# Patient Record
Sex: FEMALE | Race: WHITE | URBAN - METROPOLITAN AREA
[De-identification: names, ages, dates, MRNs, and addresses within clinical notes are randomized per-mention and may not be internally consistent; named-entity substitution may affect disease eponyms.]

---

## 2018-03-29 ENCOUNTER — ED HISTORICAL/CONVERTED ENCOUNTER (OUTPATIENT)
Dept: OTHER | Age: 58
End: 2018-03-29

## 2018-04-17 ENCOUNTER — OP HISTORICAL/CONVERTED ENCOUNTER (OUTPATIENT)
Dept: OTHER | Age: 58
End: 2018-04-17

## 2018-05-29 ENCOUNTER — OP HISTORICAL/CONVERTED ENCOUNTER (OUTPATIENT)
Dept: OTHER | Age: 58
End: 2018-05-29

## 2024-03-11 ENCOUNTER — TELEPHONE (OUTPATIENT)
Age: 64
End: 2024-03-11

## 2024-03-11 NOTE — TELEPHONE ENCOUNTER
I called [facility] [phone] requested offive visit notes and labs

## 2024-03-17 PROBLEM — N30.91 CYSTITIS WITH HEMATURIA: Status: ACTIVE | Noted: 2024-03-17

## 2024-03-18 ENCOUNTER — OFFICE VISIT (OUTPATIENT)
Age: 64
End: 2024-03-18
Payer: MEDICAID

## 2024-03-18 VITALS — DIASTOLIC BLOOD PRESSURE: 85 MMHG | HEART RATE: 102 BPM | SYSTOLIC BLOOD PRESSURE: 133 MMHG

## 2024-03-18 DIAGNOSIS — R31.0 GROSS HEMATURIA: ICD-10-CM

## 2024-03-18 DIAGNOSIS — N34.2 ATROPHIC URETHRITIS: ICD-10-CM

## 2024-03-18 DIAGNOSIS — N30.91 CYSTITIS WITH HEMATURIA: Primary | ICD-10-CM

## 2024-03-18 DIAGNOSIS — Z85.51 HISTORY OF BLADDER CANCER: ICD-10-CM

## 2024-03-18 DIAGNOSIS — Z85.41 HISTORY OF CERVICAL CANCER: ICD-10-CM

## 2024-03-18 DIAGNOSIS — Z87.440 HISTORY OF RECURRENT UTIS: ICD-10-CM

## 2024-03-18 DIAGNOSIS — R82.81 PYURIA: ICD-10-CM

## 2024-03-18 DIAGNOSIS — R35.0 FREQUENCY OF MICTURITION: ICD-10-CM

## 2024-03-18 DIAGNOSIS — N30.40 RADIATION CYSTITIS: ICD-10-CM

## 2024-03-18 LAB
BILIRUBIN, URINE, POC: NEGATIVE
BLOOD URINE, POC: NORMAL
GLUCOSE URINE, POC: NEGATIVE
KETONES, URINE, POC: NEGATIVE
LEUKOCYTE ESTERASE, URINE, POC: NORMAL
NITRITE, URINE, POC: NEGATIVE
PH, URINE, POC: 5.5 (ref 4.6–8)
PROTEIN,URINE, POC: NORMAL
PVR, POC: NORMAL CC
SPECIFIC GRAVITY, URINE, POC: 1.02 (ref 1–1.03)
URINALYSIS CLARITY, POC: CLEAR
URINALYSIS COLOR, POC: YELLOW
UROBILINOGEN, POC: NORMAL

## 2024-03-18 PROCEDURE — 51798 US URINE CAPACITY MEASURE: CPT | Performed by: UROLOGY

## 2024-03-18 PROCEDURE — 99204 OFFICE O/P NEW MOD 45 MIN: CPT | Performed by: UROLOGY

## 2024-03-18 PROCEDURE — 81003 URINALYSIS AUTO W/O SCOPE: CPT | Performed by: UROLOGY

## 2024-03-18 RX ORDER — LEVOTHYROXINE SODIUM 0.1 MG/1
100 TABLET ORAL DAILY
COMMUNITY
Start: 2024-03-09

## 2024-03-18 RX ORDER — ESTRADIOL 0.1 MG/G
1 CREAM VAGINAL
Qty: 42.5 G | Refills: 5 | Status: SHIPPED | OUTPATIENT
Start: 2024-03-18

## 2024-03-18 RX ORDER — NITROFURANTOIN 25; 75 MG/1; MG/1
CAPSULE ORAL
COMMUNITY
Start: 2024-02-27

## 2024-03-18 NOTE — PROGRESS NOTES
Chief Complaint   Patient presents with    Follow-up     PF for cystitis        /85   Pulse (!) 102      PHQ-9 score is    Negative      1. \"Have you been to the ER, urgent care clinic since your last visit?  Hospitalized since your last visit?\" No    2. \"Have you seen or consulted any other health care providers outside of the [facility] since your last visit?\" No     3. For patients aged 45-75: Has the patient had a colonoscopy / FIT/ Cologuard? Yes - no Care Gap present      If the patient is female:    4. For patients aged 40-74: Has the patient had a mammogram within the past 2 years? Yes - no Care Gap present      5. For patients aged 21-65: Has the patient had a pap smear? Yes - no Care Gap present  
Future  6. Radiation cystitis  7. History of recurrent UTIs  -     estradiol (ESTRACE VAGINAL) 0.1 MG/GM vaginal cream; Place 1 g vaginally three times a week, Disp-42.5 g, R-5Normal  8. Atrophic urethritis  -     estradiol (ESTRACE VAGINAL) 0.1 MG/GM vaginal cream; Place 1 g vaginally three times a week, Disp-42.5 g, R-5Normal  9. History of cervical cancer  -     CT ABDOMEN PELVIS W WO CONTRAST Additional Contrast? Radiologist Recommendation; Future      Patient needs CT scan as well as Estrace as well as cystoscopy with urine for cytology  Jerome Abarca MD      Please note that portions of this note was potentially completed with Dragon dictation, the computer voice recognition software.  Quite often unanticipated grammatical, syntax, homophones, and other interpretive errors are inadvertently transcribed by the computer software.  Please disregard these errors.  Please excuse any errors that have escaped final proofreading.  Thank you.

## 2024-03-19 ASSESSMENT — ENCOUNTER SYMPTOMS: DIARRHEA: 1

## 2024-03-20 LAB — BACTERIA UR CULT: NO GROWTH

## 2024-03-22 ENCOUNTER — TELEPHONE (OUTPATIENT)
Age: 64
End: 2024-03-22

## 2024-03-22 NOTE — TELEPHONE ENCOUNTER
Pt called saying her pharmacy sent us back a request to send something beside the estradiol as her insurance doesn't cover it. Have you received anything about this? Does this medication require prior auth? Please advise.

## 2024-03-25 RX ORDER — CONJUGATED ESTROGENS 0.62 MG/G
CREAM VAGINAL
Qty: 30 G | Refills: 2 | Status: SHIPPED | OUTPATIENT
Start: 2024-03-25

## 2024-04-03 ENCOUNTER — HOSPITAL ENCOUNTER (OUTPATIENT)
Facility: HOSPITAL | Age: 64
Discharge: HOME | End: 2024-04-06
Payer: MEDICAID

## 2024-04-03 ENCOUNTER — PROCEDURE VISIT (OUTPATIENT)
Age: 64
End: 2024-04-03
Payer: MEDICAID

## 2024-04-03 ENCOUNTER — PREP FOR PROCEDURE (OUTPATIENT)
Age: 64
End: 2024-04-03

## 2024-04-03 VITALS
SYSTOLIC BLOOD PRESSURE: 123 MMHG | OXYGEN SATURATION: 99 % | TEMPERATURE: 98.7 F | BODY MASS INDEX: 29.1 KG/M2 | HEART RATE: 90 BPM | RESPIRATION RATE: 18 BRPM | WEIGHT: 192 LBS | DIASTOLIC BLOOD PRESSURE: 89 MMHG | HEIGHT: 68 IN

## 2024-04-03 VITALS — SYSTOLIC BLOOD PRESSURE: 151 MMHG | HEART RATE: 98 BPM | DIASTOLIC BLOOD PRESSURE: 90 MMHG

## 2024-04-03 DIAGNOSIS — Z87.440 HISTORY OF RECURRENT UTIS: ICD-10-CM

## 2024-04-03 DIAGNOSIS — N30.40 RADIATION CYSTITIS: ICD-10-CM

## 2024-04-03 DIAGNOSIS — Z85.51 HISTORY OF BLADDER CANCER: Primary | ICD-10-CM

## 2024-04-03 DIAGNOSIS — R31.0 GROSS HEMATURIA: ICD-10-CM

## 2024-04-03 LAB
ABO + RH BLD: NORMAL
ALBUMIN SERPL-MCNC: 4 G/DL (ref 3.5–5)
ALBUMIN/GLOB SERPL: 1.2 (ref 1.1–2.2)
ALP SERPL-CCNC: 79 U/L (ref 45–117)
ALT SERPL-CCNC: 27 U/L (ref 12–78)
ANION GAP SERPL CALC-SCNC: 6 MMOL/L (ref 5–15)
APPEARANCE UR: CLEAR
AST SERPL W P-5'-P-CCNC: 18 U/L (ref 15–37)
BACTERIA URNS QL MICRO: NEGATIVE /HPF
BACTERIA URNS QL MICRO: NEGATIVE /HPF
BASOPHILS # BLD: 0.1 K/UL (ref 0–0.1)
BASOPHILS NFR BLD: 1 % (ref 0–1)
BILIRUB SERPL-MCNC: 0.5 MG/DL (ref 0.2–1)
BILIRUB UR QL: NEGATIVE
BILIRUBIN, URINE, POC: NEGATIVE
BLOOD GROUP ANTIBODIES SERPL: NEGATIVE
BLOOD URINE, POC: NORMAL
BUN SERPL-MCNC: 21 MG/DL (ref 6–20)
BUN/CREAT SERPL: 22 (ref 12–20)
CA-I BLD-MCNC: 9.3 MG/DL (ref 8.5–10.1)
CHLORIDE SERPL-SCNC: 108 MMOL/L (ref 97–108)
CO2 SERPL-SCNC: 26 MMOL/L (ref 21–32)
COLOR UR: ABNORMAL
CREAT SERPL-MCNC: 0.97 MG/DL (ref 0.55–1.02)
DIFFERENTIAL METHOD BLD: NORMAL
EOSINOPHIL # BLD: 0.4 K/UL (ref 0–0.4)
EOSINOPHIL NFR BLD: 6 % (ref 0–7)
EPITH CASTS URNS QL MICRO: ABNORMAL /LPF
ERYTHROCYTE [DISTWIDTH] IN BLOOD BY AUTOMATED COUNT: 14 % (ref 11.5–14.5)
GLOBULIN SER CALC-MCNC: 3.3 G/DL (ref 2–4)
GLUCOSE SERPL-MCNC: 109 MG/DL (ref 65–100)
GLUCOSE UR STRIP.AUTO-MCNC: NEGATIVE MG/DL
GLUCOSE URINE, POC: NEGATIVE
HCT VFR BLD AUTO: 40.4 % (ref 35–47)
HGB BLD-MCNC: 13.1 G/DL (ref 11.5–16)
HGB UR QL STRIP: ABNORMAL
IMM GRANULOCYTES # BLD AUTO: 0 K/UL (ref 0–0.04)
IMM GRANULOCYTES NFR BLD AUTO: 0 % (ref 0–0.5)
KETONES UR QL STRIP.AUTO: NEGATIVE MG/DL
KETONES, URINE, POC: NEGATIVE
LEUKOCYTE ESTERASE UR QL STRIP.AUTO: ABNORMAL
LEUKOCYTE ESTERASE, URINE, POC: NORMAL
LYMPHOCYTES # BLD: 1.5 K/UL (ref 0.8–3.5)
LYMPHOCYTES NFR BLD: 20 % (ref 12–49)
MCH RBC QN AUTO: 30.2 PG (ref 26–34)
MCHC RBC AUTO-ENTMCNC: 32.4 G/DL (ref 30–36.5)
MCV RBC AUTO: 93.1 FL (ref 80–99)
MONOCYTES # BLD: 0.7 K/UL (ref 0–1)
MONOCYTES NFR BLD: 9 % (ref 5–13)
MUCOUS THREADS URNS QL MICRO: ABNORMAL /LPF
MUCOUS THREADS URNS QL MICRO: ABNORMAL /LPF
NEUTS SEG # BLD: 4.8 K/UL (ref 1.8–8)
NEUTS SEG NFR BLD: 64 % (ref 32–75)
NITRITE UR QL STRIP.AUTO: NEGATIVE
NITRITE, URINE, POC: NEGATIVE
NRBC # BLD: 0 K/UL (ref 0–0.01)
NRBC BLD-RTO: 0 PER 100 WBC
PH UR STRIP: 6 (ref 5–8)
PH, URINE, POC: 5.5 (ref 4.6–8)
PLATELET # BLD AUTO: 260 K/UL (ref 150–400)
PMV BLD AUTO: 11 FL (ref 8.9–12.9)
POTASSIUM SERPL-SCNC: 4 MMOL/L (ref 3.5–5.1)
PROT SERPL-MCNC: 7.3 G/DL (ref 6.4–8.2)
PROT UR STRIP-MCNC: NEGATIVE MG/DL
PROTEIN,URINE, POC: NEGATIVE
RBC # BLD AUTO: 4.34 M/UL (ref 3.8–5.2)
RBC #/AREA URNS HPF: ABNORMAL /HPF (ref 0–5)
RBC #/AREA URNS HPF: ABNORMAL /HPF (ref 0–5)
SODIUM SERPL-SCNC: 140 MMOL/L (ref 136–145)
SP GR UR REFRACTOMETRY: 1.01 (ref 1–1.03)
SPECIFIC GRAVITY, URINE, POC: 1.02 (ref 1–1.03)
SPECIMEN EXP DATE BLD: NORMAL
URINALYSIS CLARITY, POC: CLEAR
URINALYSIS COLOR, POC: YELLOW
UROBILINOGEN UR QL STRIP.AUTO: 0.1 EU/DL (ref 0.1–1)
UROBILINOGEN, POC: NORMAL
WBC # BLD AUTO: 7.5 K/UL (ref 3.6–11)
WBC URNS QL MICRO: ABNORMAL /HPF (ref 0–4)
WBC URNS QL MICRO: ABNORMAL /HPF (ref 0–4)

## 2024-04-03 PROCEDURE — 86850 RBC ANTIBODY SCREEN: CPT

## 2024-04-03 PROCEDURE — 86900 BLOOD TYPING SEROLOGIC ABO: CPT

## 2024-04-03 PROCEDURE — 80053 COMPREHEN METABOLIC PANEL: CPT

## 2024-04-03 PROCEDURE — 36415 COLL VENOUS BLD VENIPUNCTURE: CPT

## 2024-04-03 PROCEDURE — 86901 BLOOD TYPING SEROLOGIC RH(D): CPT

## 2024-04-03 PROCEDURE — 85025 COMPLETE CBC W/AUTO DIFF WBC: CPT

## 2024-04-03 PROCEDURE — 81001 URINALYSIS AUTO W/SCOPE: CPT

## 2024-04-03 PROCEDURE — 81003 URINALYSIS AUTO W/O SCOPE: CPT | Performed by: UROLOGY

## 2024-04-03 RX ORDER — CETIRIZINE HYDROCHLORIDE 10 MG/1
CAPSULE, LIQUID FILLED ORAL
COMMUNITY

## 2024-04-03 NOTE — PROGRESS NOTES
Chief Complaint   Patient presents with    Procedure     cysto    Hematuria        BP (!) 151/90   Pulse 98      PHQ-9 score is    Negative      1. \"Have you been to the ER, urgent care clinic since your last visit?  Hospitalized since your last visit?\" No    2. \"Have you seen or consulted any other health care providers outside of the [facility] since your last visit?\" No     3. For patients aged 45-75: Has the patient had a colonoscopy / FIT/ Cologuard? Yes - no Care Gap present      If the patient is female:    4. For patients aged 40-74: Has the patient had a mammogram within the past 2 years? Yes - no Care Gap present      5. For patients aged 21-65: Has the patient had a pap smear? Yes - no Care Gap present

## 2024-04-03 NOTE — PROGRESS NOTES
HISTORY OF PRESENT ILLNESS  Daisy Michaud is a 63 y.o. female     1. History of bladder cancer  Overview:  2018 bladder cancer    2. Radiation cystitis  Overview:  S/p cervical cancer with a hysterectomy and radiation and chemotherapy   3. Gross hematuria  Overview:  2018 bladder cancer  Urine cytology negative   Ct scan is not done  4. History of recurrent UTIs  Overview:  3/18/2024 urine culture negative  Orders:  -     AMB POC URINALYSIS DIP STICK AUTO W/O MICRO  -     Culture, Urine        PAST MEDICAL HISTORY  PMHx (including negatives):  has a past medical history of Cervical cancer (HCC).   PSurgHx:  has a past surgical history that includes Cystocopy (04/03/2024).  PSocHx:  reports that she has quit smoking. Her smoking use included cigarettes. She has never used smokeless tobacco. She reports that she does not currently use alcohol. She reports that she does not use drugs.   FamilyHX:   Family History   Problem Relation Age of Onset    Stomach Cancer Father        ROS  Review of Systems      PHYSICAL EXAM  Physical Exam    ASSESSMENT and PLAN  1. History of bladder cancer  2. Radiation cystitis  3. Gross hematuria  4. History of recurrent UTIs  -     AMB POC URINALYSIS DIP STICK AUTO W/O MICRO  -     Culture, Urine        Jerome Abarca MD      Please note that portions of this note was potentially completed with Dragon dictation, the computer voice recognition software.  Quite often unanticipated grammatical, syntax, homophones, and other interpretive errors are inadvertently transcribed by the computer software.  Please disregard these errors.  Please excuse any errors that have escaped final proofreading.  Thank you.

## 2024-04-05 ENCOUNTER — ANESTHESIA (OUTPATIENT)
Facility: HOSPITAL | Age: 64
End: 2024-04-05
Payer: MEDICAID

## 2024-04-05 ENCOUNTER — HOSPITAL ENCOUNTER (OUTPATIENT)
Facility: HOSPITAL | Age: 64
Discharge: HOME | End: 2024-04-05
Attending: UROLOGY | Admitting: UROLOGY
Payer: MEDICAID

## 2024-04-05 ENCOUNTER — ANESTHESIA EVENT (OUTPATIENT)
Facility: HOSPITAL | Age: 64
End: 2024-04-05
Payer: MEDICAID

## 2024-04-05 VITALS
HEART RATE: 52 BPM | SYSTOLIC BLOOD PRESSURE: 153 MMHG | TEMPERATURE: 97.9 F | OXYGEN SATURATION: 98 % | RESPIRATION RATE: 18 BRPM | DIASTOLIC BLOOD PRESSURE: 76 MMHG

## 2024-04-05 DIAGNOSIS — Z85.51 HISTORY OF BLADDER CANCER: ICD-10-CM

## 2024-04-05 LAB — BACTERIA UR CULT: NORMAL

## 2024-04-05 PROCEDURE — 6360000002 HC RX W HCPCS: Performed by: UROLOGY

## 2024-04-05 PROCEDURE — 6370000000 HC RX 637 (ALT 250 FOR IP): Performed by: UROLOGY

## 2024-04-05 PROCEDURE — 88307 TISSUE EXAM BY PATHOLOGIST: CPT

## 2024-04-05 PROCEDURE — 3700000001 HC ADD 15 MINUTES (ANESTHESIA): Performed by: UROLOGY

## 2024-04-05 PROCEDURE — 7100000000 HC PACU RECOVERY - FIRST 15 MIN: Performed by: UROLOGY

## 2024-04-05 PROCEDURE — 7100000011 HC PHASE II RECOVERY - ADDTL 15 MIN: Performed by: UROLOGY

## 2024-04-05 PROCEDURE — 2500000003 HC RX 250 WO HCPCS: Performed by: NURSE ANESTHETIST, CERTIFIED REGISTERED

## 2024-04-05 PROCEDURE — 2709999900 HC NON-CHARGEABLE SUPPLY: Performed by: UROLOGY

## 2024-04-05 PROCEDURE — 3700000000 HC ANESTHESIA ATTENDED CARE: Performed by: UROLOGY

## 2024-04-05 PROCEDURE — 6360000002 HC RX W HCPCS: Performed by: NURSE ANESTHETIST, CERTIFIED REGISTERED

## 2024-04-05 PROCEDURE — 7100000010 HC PHASE II RECOVERY - FIRST 15 MIN: Performed by: UROLOGY

## 2024-04-05 PROCEDURE — 2580000003 HC RX 258: Performed by: UROLOGY

## 2024-04-05 PROCEDURE — 3600000003 HC SURGERY LEVEL 3 BASE: Performed by: UROLOGY

## 2024-04-05 PROCEDURE — 7100000001 HC PACU RECOVERY - ADDTL 15 MIN: Performed by: UROLOGY

## 2024-04-05 PROCEDURE — 3600000013 HC SURGERY LEVEL 3 ADDTL 15MIN: Performed by: UROLOGY

## 2024-04-05 RX ORDER — FENTANYL CITRATE 50 UG/ML
INJECTION, SOLUTION INTRAMUSCULAR; INTRAVENOUS PRN
Status: DISCONTINUED | OUTPATIENT
Start: 2024-04-05 | End: 2024-04-05 | Stop reason: SDUPTHER

## 2024-04-05 RX ORDER — SODIUM CHLORIDE 0.9 % (FLUSH) 0.9 %
5-40 SYRINGE (ML) INJECTION EVERY 12 HOURS SCHEDULED
Status: DISCONTINUED | OUTPATIENT
Start: 2024-04-05 | End: 2024-04-05 | Stop reason: HOSPADM

## 2024-04-05 RX ORDER — ONDANSETRON 2 MG/ML
INJECTION INTRAMUSCULAR; INTRAVENOUS PRN
Status: DISCONTINUED | OUTPATIENT
Start: 2024-04-05 | End: 2024-04-05 | Stop reason: SDUPTHER

## 2024-04-05 RX ORDER — DIPHENHYDRAMINE HYDROCHLORIDE 50 MG/ML
12.5 INJECTION INTRAMUSCULAR; INTRAVENOUS
Status: DISCONTINUED | OUTPATIENT
Start: 2024-04-05 | End: 2024-04-05 | Stop reason: HOSPADM

## 2024-04-05 RX ORDER — SODIUM CHLORIDE, SODIUM LACTATE, POTASSIUM CHLORIDE, CALCIUM CHLORIDE 600; 310; 30; 20 MG/100ML; MG/100ML; MG/100ML; MG/100ML
INJECTION, SOLUTION INTRAVENOUS ONCE
Status: DISCONTINUED | OUTPATIENT
Start: 2024-04-05 | End: 2024-04-05 | Stop reason: HOSPADM

## 2024-04-05 RX ORDER — PHENYLEPHRINE HCL IN 0.9% NACL 0.4MG/10ML
SYRINGE (ML) INTRAVENOUS PRN
Status: DISCONTINUED | OUTPATIENT
Start: 2024-04-05 | End: 2024-04-05 | Stop reason: SDUPTHER

## 2024-04-05 RX ORDER — OXYBUTYNIN CHLORIDE 5 MG/1
10 TABLET, EXTENDED RELEASE ORAL NIGHTLY
Status: DISCONTINUED | OUTPATIENT
Start: 2024-04-05 | End: 2024-04-05 | Stop reason: HOSPADM

## 2024-04-05 RX ORDER — FENTANYL CITRATE 50 UG/ML
50 INJECTION, SOLUTION INTRAMUSCULAR; INTRAVENOUS EVERY 5 MIN PRN
Status: DISCONTINUED | OUTPATIENT
Start: 2024-04-05 | End: 2024-04-05 | Stop reason: HOSPADM

## 2024-04-05 RX ORDER — SULFAMETHOXAZOLE AND TRIMETHOPRIM 800; 160 MG/1; MG/1
1 TABLET ORAL 2 TIMES DAILY
Qty: 10 TABLET | Refills: 0 | Status: SHIPPED | OUTPATIENT
Start: 2024-04-05 | End: 2024-04-10

## 2024-04-05 RX ORDER — DEXAMETHASONE SODIUM PHOSPHATE 4 MG/ML
INJECTION, SOLUTION INTRA-ARTICULAR; INTRALESIONAL; INTRAMUSCULAR; INTRAVENOUS; SOFT TISSUE PRN
Status: DISCONTINUED | OUTPATIENT
Start: 2024-04-05 | End: 2024-04-05 | Stop reason: SDUPTHER

## 2024-04-05 RX ORDER — LABETALOL HYDROCHLORIDE 5 MG/ML
10 INJECTION, SOLUTION INTRAVENOUS
Status: DISCONTINUED | OUTPATIENT
Start: 2024-04-05 | End: 2024-04-05 | Stop reason: HOSPADM

## 2024-04-05 RX ORDER — SODIUM CHLORIDE 0.9 % (FLUSH) 0.9 %
5-40 SYRINGE (ML) INJECTION PRN
Status: DISCONTINUED | OUTPATIENT
Start: 2024-04-05 | End: 2024-04-05 | Stop reason: HOSPADM

## 2024-04-05 RX ORDER — OXYCODONE HYDROCHLORIDE 5 MG/1
5 TABLET ORAL PRN
Status: DISCONTINUED | OUTPATIENT
Start: 2024-04-05 | End: 2024-04-05 | Stop reason: HOSPADM

## 2024-04-05 RX ORDER — LIDOCAINE HYDROCHLORIDE 20 MG/ML
JELLY TOPICAL PRN
Status: DISCONTINUED | OUTPATIENT
Start: 2024-04-05 | End: 2024-04-05 | Stop reason: ALTCHOICE

## 2024-04-05 RX ORDER — METOCLOPRAMIDE HYDROCHLORIDE 5 MG/ML
10 INJECTION INTRAMUSCULAR; INTRAVENOUS
Status: DISCONTINUED | OUTPATIENT
Start: 2024-04-05 | End: 2024-04-05 | Stop reason: HOSPADM

## 2024-04-05 RX ORDER — ONDANSETRON 2 MG/ML
4 INJECTION INTRAMUSCULAR; INTRAVENOUS
Status: DISCONTINUED | OUTPATIENT
Start: 2024-04-05 | End: 2024-04-05 | Stop reason: HOSPADM

## 2024-04-05 RX ORDER — MEPERIDINE HYDROCHLORIDE 25 MG/ML
12.5 INJECTION INTRAMUSCULAR; INTRAVENOUS; SUBCUTANEOUS EVERY 5 MIN PRN
Status: DISCONTINUED | OUTPATIENT
Start: 2024-04-05 | End: 2024-04-05 | Stop reason: HOSPADM

## 2024-04-05 RX ORDER — GLUCAGON 1 MG/ML
1 KIT INJECTION PRN
Status: DISCONTINUED | OUTPATIENT
Start: 2024-04-05 | End: 2024-04-05 | Stop reason: HOSPADM

## 2024-04-05 RX ORDER — SODIUM CHLORIDE, SODIUM LACTATE, POTASSIUM CHLORIDE, CALCIUM CHLORIDE 600; 310; 30; 20 MG/100ML; MG/100ML; MG/100ML; MG/100ML
INJECTION, SOLUTION INTRAVENOUS CONTINUOUS
Status: DISCONTINUED | OUTPATIENT
Start: 2024-04-05 | End: 2024-04-05 | Stop reason: HOSPADM

## 2024-04-05 RX ORDER — LIDOCAINE HYDROCHLORIDE 20 MG/ML
INJECTION, SOLUTION EPIDURAL; INFILTRATION; INTRACAUDAL; PERINEURAL PRN
Status: DISCONTINUED | OUTPATIENT
Start: 2024-04-05 | End: 2024-04-05 | Stop reason: SDUPTHER

## 2024-04-05 RX ORDER — SODIUM CHLORIDE 9 MG/ML
INJECTION, SOLUTION INTRAVENOUS PRN
Status: DISCONTINUED | OUTPATIENT
Start: 2024-04-05 | End: 2024-04-05 | Stop reason: HOSPADM

## 2024-04-05 RX ORDER — NALOXONE HYDROCHLORIDE 0.4 MG/ML
INJECTION, SOLUTION INTRAMUSCULAR; INTRAVENOUS; SUBCUTANEOUS PRN
Status: DISCONTINUED | OUTPATIENT
Start: 2024-04-05 | End: 2024-04-05 | Stop reason: HOSPADM

## 2024-04-05 RX ORDER — OXYCODONE HYDROCHLORIDE 5 MG/1
10 TABLET ORAL PRN
Status: DISCONTINUED | OUTPATIENT
Start: 2024-04-05 | End: 2024-04-05 | Stop reason: HOSPADM

## 2024-04-05 RX ORDER — LIDOCAINE 4 G/G
1 PATCH TOPICAL AS NEEDED
Status: DISCONTINUED | OUTPATIENT
Start: 2024-04-05 | End: 2024-04-05 | Stop reason: HOSPADM

## 2024-04-05 RX ORDER — OXYBUTYNIN CHLORIDE 10 MG/1
10 TABLET, EXTENDED RELEASE ORAL DAILY
Qty: 30 TABLET | Refills: 2 | Status: SHIPPED | OUTPATIENT
Start: 2024-04-05

## 2024-04-05 RX ORDER — NEOSTIGMINE METHYLSULFATE 5 MG/5 ML
SYRINGE (ML) INTRAVENOUS PRN
Status: DISCONTINUED | OUTPATIENT
Start: 2024-04-05 | End: 2024-04-05 | Stop reason: SDUPTHER

## 2024-04-05 RX ORDER — HYDROMORPHONE HYDROCHLORIDE 1 MG/ML
0.5 INJECTION, SOLUTION INTRAMUSCULAR; INTRAVENOUS; SUBCUTANEOUS EVERY 5 MIN PRN
Status: DISCONTINUED | OUTPATIENT
Start: 2024-04-05 | End: 2024-04-05 | Stop reason: HOSPADM

## 2024-04-05 RX ORDER — IPRATROPIUM BROMIDE AND ALBUTEROL SULFATE 2.5; .5 MG/3ML; MG/3ML
1 SOLUTION RESPIRATORY (INHALATION)
Status: DISCONTINUED | OUTPATIENT
Start: 2024-04-05 | End: 2024-04-05 | Stop reason: HOSPADM

## 2024-04-05 RX ORDER — METHENAMINE HIPPURATE 1000 MG/1
1 TABLET ORAL 2 TIMES DAILY WITH MEALS
Qty: 30 TABLET | Refills: 0 | Status: SHIPPED | OUTPATIENT
Start: 2024-04-05

## 2024-04-05 RX ORDER — OXYBUTYNIN CHLORIDE 5 MG/1
10 TABLET, EXTENDED RELEASE ORAL ONCE
Status: COMPLETED | OUTPATIENT
Start: 2024-04-05 | End: 2024-04-05

## 2024-04-05 RX ORDER — ROCURONIUM BROMIDE 10 MG/ML
INJECTION, SOLUTION INTRAVENOUS PRN
Status: DISCONTINUED | OUTPATIENT
Start: 2024-04-05 | End: 2024-04-05 | Stop reason: SDUPTHER

## 2024-04-05 RX ORDER — HYDRALAZINE HYDROCHLORIDE 20 MG/ML
10 INJECTION INTRAMUSCULAR; INTRAVENOUS
Status: DISCONTINUED | OUTPATIENT
Start: 2024-04-05 | End: 2024-04-05 | Stop reason: HOSPADM

## 2024-04-05 RX ORDER — MIDAZOLAM HYDROCHLORIDE 1 MG/ML
INJECTION INTRAMUSCULAR; INTRAVENOUS PRN
Status: DISCONTINUED | OUTPATIENT
Start: 2024-04-05 | End: 2024-04-05 | Stop reason: SDUPTHER

## 2024-04-05 RX ORDER — LORAZEPAM 2 MG/ML
0.5 INJECTION INTRAMUSCULAR
Status: DISCONTINUED | OUTPATIENT
Start: 2024-04-05 | End: 2024-04-05 | Stop reason: HOSPADM

## 2024-04-05 RX ORDER — DEXTROSE MONOHYDRATE 100 MG/ML
INJECTION, SOLUTION INTRAVENOUS CONTINUOUS PRN
Status: DISCONTINUED | OUTPATIENT
Start: 2024-04-05 | End: 2024-04-05 | Stop reason: HOSPADM

## 2024-04-05 RX ORDER — GLYCOPYRROLATE 0.2 MG/ML
INJECTION INTRAMUSCULAR; INTRAVENOUS PRN
Status: DISCONTINUED | OUTPATIENT
Start: 2024-04-05 | End: 2024-04-05 | Stop reason: SDUPTHER

## 2024-04-05 RX ADMIN — MIDAZOLAM HYDROCHLORIDE 2 MG: 1 INJECTION INTRAMUSCULAR; INTRAVENOUS at 11:17

## 2024-04-05 RX ADMIN — ONDANSETRON 4 MG: 2 INJECTION INTRAMUSCULAR; INTRAVENOUS at 11:34

## 2024-04-05 RX ADMIN — SODIUM CHLORIDE, POTASSIUM CHLORIDE, SODIUM LACTATE AND CALCIUM CHLORIDE: 600; 310; 30; 20 INJECTION, SOLUTION INTRAVENOUS at 11:17

## 2024-04-05 RX ADMIN — GLYCOPYRROLATE 0.2 MG: 0.2 INJECTION INTRAMUSCULAR; INTRAVENOUS at 11:17

## 2024-04-05 RX ADMIN — FENTANYL CITRATE 25 MCG: 50 INJECTION, SOLUTION INTRAMUSCULAR; INTRAVENOUS at 11:52

## 2024-04-05 RX ADMIN — ROCURONIUM BROMIDE 50 MG: 10 INJECTION, SOLUTION INTRAVENOUS at 11:28

## 2024-04-05 RX ADMIN — CEFAZOLIN SODIUM 2000 MG: 1 INJECTION, POWDER, FOR SOLUTION INTRAMUSCULAR; INTRAVENOUS at 11:22

## 2024-04-05 RX ADMIN — Medication 100 MCG: at 11:41

## 2024-04-05 RX ADMIN — OXYBUTYNIN CHLORIDE 10 MG: 5 TABLET, EXTENDED RELEASE ORAL at 13:15

## 2024-04-05 RX ADMIN — FENTANYL CITRATE 50 MCG: 50 INJECTION, SOLUTION INTRAMUSCULAR; INTRAVENOUS at 11:28

## 2024-04-05 RX ADMIN — Medication 3.5 MG: at 12:13

## 2024-04-05 RX ADMIN — DEXAMETHASONE SODIUM PHOSPHATE 4 MG: 4 INJECTION, SOLUTION INTRA-ARTICULAR; INTRALESIONAL; INTRAMUSCULAR; INTRAVENOUS; SOFT TISSUE at 11:34

## 2024-04-05 RX ADMIN — LIDOCAINE HYDROCHLORIDE 100 MG: 20 INJECTION, SOLUTION EPIDURAL; INFILTRATION; INTRACAUDAL; PERINEURAL at 11:28

## 2024-04-05 RX ADMIN — GLYCOPYRROLATE 0.4 MG: 0.2 INJECTION INTRAMUSCULAR; INTRAVENOUS at 12:13

## 2024-04-05 RX ADMIN — FENTANYL CITRATE 25 MCG: 50 INJECTION, SOLUTION INTRAMUSCULAR; INTRAVENOUS at 11:56

## 2024-04-05 ASSESSMENT — PAIN - FUNCTIONAL ASSESSMENT
PAIN_FUNCTIONAL_ASSESSMENT: NONE - DENIES PAIN
PAIN_FUNCTIONAL_ASSESSMENT: NONE - DENIES PAIN
PAIN_FUNCTIONAL_ASSESSMENT: 0-10

## 2024-04-05 ASSESSMENT — LIFESTYLE VARIABLES: SMOKING_STATUS: 1

## 2024-04-05 NOTE — DISCHARGE INSTRUCTIONS
TRANSURETHRAL RESECTION OF BLADDER TUMOR (TURBT) WITH INSTILLATION OF MITOMYCIN INTO THE BLADDER    Why do I need a TURBT?  The procedure is done to diagnose and treat early stage of bladder cancer. During the procedure the doctor will take tissue samples from the area where cancer may exist and send it to a pathology lab for testing     What is intravesical chemotherapy Mitomycin ?  Mitomycin  is a type of chemotherapy that is given into the bladder. It coats the bladder lining  and and works by stopping the growth and division of cancer cells. The aim of the treatment is  to kill any cancer cells left in the bladder or disturbed by the TURBT and reduce the risk of  further ones growing.   How is the treatment given?      Mitomycin will be instilled into your bladder through the catheter    What should I do for 48 hours after each treatment?  - Stay hydrated  at least  for 48 hours after treatment to help flush  the treatment out of the bladder.  -Refrain from sexual intercourse to protect your partner  -If you leak urine onto your clothes, wash well in hot water or on a hot washing machine cycle.  -Each time you pass urine wash your hands  with soap and water.     Possible side-effects:   Discomfort when passing urine or having to pass urine more frequently. This is due to  the Mitomycin irritating the lining of the bladder. It should settle within 48 hours.     Blood in your urine. Staying well hydrated can ease all the symptoms   If symptoms do not improve after 2-3 days you should consult your urology team     Smelly or cloudy urine. This may mean that you have a urine infection. You should contact  your urology who may give you some antibiotics to take.   Rash on your palms or genitalia. This can occur due to   the solution has come into contact with your skin. The best way to reduce the risk of this is by  washing your hands and private parts each time you pass urine.    Risks: As with any surgical procedure,  there are some risks involved with a cystoscopy. Complications may include:    Urinary tract infection  Bleeding   Injury to bladder or urethra     Patient should contact their physician if they experience any of the following symptoms:    Fever  Chills  Painful urination   Passing large amounts of blood in your urine

## 2024-04-05 NOTE — ANESTHESIA PRE PROCEDURE
Department of Anesthesiology  Preprocedure Note       Name:  Daisy Michaud   Age:  63 y.o.  :  1960                                          MRN:  [id number]         Date:  2024      Surgeon: Surgeon(s):  Jerome Abarca MD    Procedure: Procedure(s):  CYSTOURETHROSCOPY AND TRANSURETHRAL RESECTION BLADDER TUMOR WITH MITOMYCIN INSTILLATION    Medications prior to admission:   Prior to Admission medications    Medication Sig Start Date End Date Taking? Authorizing Provider   Cetirizine HCl (ZYRTEC ALLERGY) 10 MG CAPS Take by mouth    Byron Bourgeois MD   estrogens conjugated (PREMARIN) 0.625 MG/GM CREA vaginal cream Apply pea size of cream  once a day on affected area on Monday,Wednesday,Friday 3/25/24   Blanca Chavarria APRN - NP   levothyroxine (SYNTHROID) 100 MCG tablet Take 1 tablet by mouth daily 3/9/24   Byron Bourgeois MD   nitrofurantoin, macrocrystal-monohydrate, (MACROBID) 100 MG capsule TAKE 1 CAPSULE BY MOUTH TWICE A DAY WITH FOOD OR MILK  Patient not taking: Reported on 3/18/2024 2/27/24   ProviderByron MD   estradiol (ESTRACE VAGINAL) 0.1 MG/GM vaginal cream Place 1 g vaginally three times a week  Patient not taking: Reported on 4/3/2024 3/18/24   Jerome Abarca MD       Current medications:    Current Facility-Administered Medications   Medication Dose Route Frequency Provider Last Rate Last Admin    lactated ringers IV soln infusion   IntraVENous Continuous Jerome Abarca MD        ceFAZolin (ANCEF) 2,000 mg in sterile water 20 mL IV syringe  2,000 mg IntraVENous Once Jerome Abarca MD         Facility-Administered Medications Ordered in Other Encounters   Medication Dose Route Frequency Provider Last Rate Last Admin    mitoMYcin (MUTAMYCIN) 40 mg in sterile water 40 mL chemo bladder instillation  40 mg Bladder Instillation Once Jerome Abarca MD           Allergies:    Allergies   Allergen Reactions    Codeine Nausea Only       Problem List:

## 2024-04-05 NOTE — OP NOTE
Operative Note      Patient: Daisy Michaud  YOB: 1960  MRN: [id number]    Date of Procedure: 4/5/2024    Pre-Op Diagnosis Codes:     * History of bladder cancer [Z85.51]  Multiple large bladder cancers in her bladder greater than 5 cm  Post-Op Diagnosis: Same additionally about 100 tiny little bladder cancers       Procedure(s):  CYSTOURETHROSCOPY AND TRANSURETHRAL RESECTION BLADDER TUMOR WITH MITOMYCIN INSTILLATION    Surgeon(s):  Jerome Abarca MD    Assistant:   * No surgical staff found *    Anesthesia: General    Estimated Blood Loss (mL): less than 50     Complications: None    Specimens:   ID Type Source Tests Collected by Time Destination   1 : BLADDER CANCER Tissue Bladder SURGICAL PATHOLOGY Jerome Abarca MD 4/5/2024 1211        Implants:  * No implants in log *      Drains:   Urinary Catheter 04/05/24 (Active)       Findings:  Infection Present At Time Of Surgery (PATOS) (choose all levels that have infection present):  No infection present  Other Findings: Patient has a large bladder cancer on the left side of the trigone had a cancer right side trigone about a centimeter and a half away by it three quarters of a centimeter in size is had another 1 back wall the bladder about a centimeter in size and then had another 1 about 4 5 mm in size is a total well over 5 cm of cancer additionally had many seedlings of bladder cancer on both sides of the bladder dome    Detailed Description of Procedure:   Patient has a past history of bladder cancer came with gross hematuria to quit bleeding.  Scoped and found the 2 large cancers in her bladder and then there were other smaller cancers in the bladder and she is set up for transurethral section of bladder cancers as well as mitomycin instillation.  She is aware of the risk of bleeding infection into the bladder urethra ureter vascular injury pulm embolus and death she is aware of alternatives she has no questions.  Procedure-patient's

## 2024-04-05 NOTE — PERIOP NOTE
Patient alert and oriented x4, VS stable, no complaints of pain at this time, little discomfort per patient. Son at bedside. Discharge instructions/education provided to son, Serjio Schafer, he verbalized understanding and had no questions. Patient discharged in wheelchair at main entrance of hospital to home with son via private vehicle.

## 2024-04-08 ENCOUNTER — TELEPHONE (OUTPATIENT)
Age: 64
End: 2024-04-08

## 2024-04-08 NOTE — TELEPHONE ENCOUNTER
Pt called stating that she had an appt on 4/18 she wanted to know if Dr. Abarca wanted to see her sooner then that. She also had questions about her catheter and when it can come out.

## 2024-04-10 ENCOUNTER — TELEPHONE (OUTPATIENT)
Age: 64
End: 2024-04-10

## 2024-04-10 NOTE — TELEPHONE ENCOUNTER
Patient called back stating that she would need to r/s appt on Monday due to transportation. Patient mentioned that she would be able to come in on Tuesday.

## 2024-04-10 NOTE — TELEPHONE ENCOUNTER
Attempted to call patient to schedule her an appointment. Due to transportation she will not be able to make an appointment with you tomorrow.  Patient would like to speak with you regarding the voiding trial.

## 2024-04-10 NOTE — TELEPHONE ENCOUNTER
Can you please schedule the patient for voiding trial with me on 4/15/2024 at 11 am. I spoke to patient, she is aware

## 2024-04-11 ENCOUNTER — OFFICE VISIT (OUTPATIENT)
Age: 64
End: 2024-04-11

## 2024-04-11 VITALS — HEART RATE: 80 BPM | DIASTOLIC BLOOD PRESSURE: 86 MMHG | SYSTOLIC BLOOD PRESSURE: 125 MMHG

## 2024-04-11 DIAGNOSIS — Z85.51 HISTORY OF BLADDER CANCER: Primary | ICD-10-CM

## 2024-04-11 PROCEDURE — 99024 POSTOP FOLLOW-UP VISIT: CPT | Performed by: NURSE PRACTITIONER

## 2024-04-11 NOTE — PROGRESS NOTES
Chief Complaint   Patient presents with    Follow-up        Blood Pressure 125/86   Pulse 80      PHQ-9 score is    Negative      1. \"Have you been to the ER, urgent care clinic since your last visit?  Hospitalized since your last visit?\" No    2. \"Have you seen or consulted any other health care providers outside of the [facility] since your last visit?\" No     3. For patients aged 45-75: Has the patient had a colonoscopy / FIT/ Cologuard? Yes - no Care Gap present      If the patient is female:    4. For patients aged 40-74: Has the patient had a mammogram within the past 2 years? Yes - no Care Gap present      5. For patients aged 21-65: Has the patient had a pap smear? Yes - no Care Gap present

## 2024-04-11 NOTE — PROGRESS NOTES
HISTORY OF PRESENT ILLNESS    Daisy Michaud is a 63 y.o. female is here for voiding trial. Patient reports that her daniel cath came out on 2/9/2024 with bowel movement. She reports having no problems  with voiding. She continues to take oxybutynin, her PVR today 200 cc. WE will d/c oxybutynin and check her residual next week. She denies any N/V, painful urination, urgency or frequency. She has scheduled Ct scan next week.  Chief Complaint   Patient presents with    Follow-up            Past Medical History:  PMHx (including negatives):  has a past medical history of Bladder cancer (HCC), Cervical cancer (HCC), and Thyroid disease.   PSurgHx:  has a past surgical history that includes Cystocopy (04/03/2024); Total vaginal hysterectomy; bladder tumor excision; Ureteral reimplantion (Bilateral); [place] tooth extraction; and Anomalous venous return repair (N/A, 4/5/2024).  PSocHx:  reports that she has quit smoking. Her smoking use included cigarettes. She has never used smokeless tobacco. She reports that she does not currently use alcohol. She reports current drug use. Drug: Marijuana (Weed).     1. History of bladder cancer  Overview:  2018 bladder cancer  She is s/p CYSTOURETHROSCOPY AND TRANSURETHRAL RESECTION BLADDER TUMOR WITH MITOMYCIN INSTILLATION on 4/5/2024          Review of Systems   All other systems reviewed and are negative.        Physical Exam  HENT:      Head: Normocephalic.      Mouth/Throat:      Mouth: Mucous membranes are moist.   Pulmonary:      Effort: Pulmonary effort is normal.   Abdominal:      Palpations: Abdomen is soft.   Musculoskeletal:         General: Normal range of motion.   Skin:     General: Skin is warm.      Capillary Refill: Capillary refill takes less than 2 seconds.   Neurological:      Mental Status: She is alert.         ASSESSMENT and PLAN  1. History of bladder cancer  Overview:  2018 bladder cancer  She is s/p CYSTOURETHROSCOPY AND TRANSURETHRAL RESECTION BLADDER TUMOR

## 2024-04-13 LAB — BACTERIA UR CULT: NO GROWTH

## 2024-04-16 ENCOUNTER — HOSPITAL ENCOUNTER (OUTPATIENT)
Facility: HOSPITAL | Age: 64
Discharge: HOME | End: 2024-04-19
Attending: UROLOGY
Payer: MEDICAID

## 2024-04-16 DIAGNOSIS — Z85.51 HISTORY OF BLADDER CANCER: ICD-10-CM

## 2024-04-16 DIAGNOSIS — R31.0 GROSS HEMATURIA: ICD-10-CM

## 2024-04-16 DIAGNOSIS — Z85.41 HISTORY OF CERVICAL CANCER: ICD-10-CM

## 2024-04-16 PROCEDURE — 74178 CT ABD&PLV WO CNTR FLWD CNTR: CPT

## 2024-04-16 PROCEDURE — 6360000004 HC RX CONTRAST MEDICATION: Performed by: UROLOGY

## 2024-04-16 RX ADMIN — IOPAMIDOL 100 ML: 755 INJECTION, SOLUTION INTRAVENOUS at 14:46

## 2024-04-17 RX ORDER — METHENAMINE HIPPURATE 1000 MG/1
1 TABLET ORAL 2 TIMES DAILY WITH MEALS
Qty: 180 TABLET | Refills: 1 | Status: SHIPPED | OUTPATIENT
Start: 2024-04-17

## 2024-04-18 ENCOUNTER — OFFICE VISIT (OUTPATIENT)
Age: 64
End: 2024-04-18
Payer: MEDICAID

## 2024-04-18 VITALS — DIASTOLIC BLOOD PRESSURE: 87 MMHG | SYSTOLIC BLOOD PRESSURE: 155 MMHG | HEART RATE: 91 BPM

## 2024-04-18 DIAGNOSIS — D49.9 HIGH GRADE NEOPLASM: ICD-10-CM

## 2024-04-18 DIAGNOSIS — Z85.51 HISTORY OF BLADDER CANCER: Primary | ICD-10-CM

## 2024-04-18 DIAGNOSIS — C67.8 MALIGNANT NEOPLASM OF OVERLAPPING SITES OF BLADDER (HCC): ICD-10-CM

## 2024-04-18 PROCEDURE — 99214 OFFICE O/P EST MOD 30 MIN: CPT | Performed by: UROLOGY

## 2024-04-18 NOTE — PROGRESS NOTES
Chief Complaint   Patient presents with    Follow-up    Results        BP (!) 155/87 (Site: Left Upper Arm, Position: Sitting, Cuff Size: Medium Adult)   Pulse 91      PHQ-9 score is    Negative      1. \"Have you been to the ER, urgent care clinic since your last visit?  Hospitalized since your last visit?\" No    2. \"Have you seen or consulted any other health care providers outside of the [facility] since your last visit?\" No     3. For patients aged 45-75: Has the patient had a colonoscopy / FIT/ Cologuard? Yes - no Care Gap present      If the patient is female:    4. For patients aged 40-74: Has the patient had a mammogram within the past 2 years? Yes - no Care Gap present      5. For patients aged 21-65: Has the patient had a pap smear? Yes - no Care Gap present

## 2024-04-18 NOTE — PROGRESS NOTES
HISTORY OF PRESENT ILLNESS  Daisy Michaud is a 63 y.o. female   I had a 30 min discussion with her and her daughter. She has invasive high grade TCC. Ct done yesterday, not read yet. I looked over it, no large obvious masses. Patient status post radiation therapy and chemo therapy for Cervical cancer in the past.   She will need a cystectomy , it wont be easy with irradiated tissue. I have referred to Doctor Opal. They have no questions.  1. History of bladder cancer  Overview:  2018 bladder cancer  She is s/p CYSTOURETHROSCOPY AND TRANSURETHRAL RESECTION BLADDER TUMOR WITH MITOMYCIN INSTILLATION on 4/5/2024   Pathology:  Bladder, transurethral resection of bladder tumor:        Invasive high-grade urothelial carcinoma.   Carcinoma invades muscularis propria.     Orders:  -     AMB POC URINALYSIS DIP STICK AUTO W/O MICRO  2. Malignant neoplasm of overlapping sites of bladder (HCC)  3. High grade neoplasm        PAST MEDICAL HISTORY  PMHx (including negatives):  has a past medical history of Bladder cancer (HCC), Cervical cancer (HCC), and Thyroid disease.   PSurgHx:  has a past surgical history that includes Cystocopy (04/03/2024); Total vaginal hysterectomy; bladder tumor excision; Ureteral reimplantion (Bilateral); [place] tooth extraction; and Anomalous venous return repair (N/A, 4/5/2024).  PSocHx:  reports that she has quit smoking. Her smoking use included cigarettes. She has never used smokeless tobacco. She reports that she does not currently use alcohol. She reports current drug use. Drug: Marijuana (Weed).   FamilyHX:   Family History   Problem Relation Age of Onset    Stomach Cancer Father        ROS  Review of Systems   All other systems reviewed and are negative.        PHYSICAL EXAM  Physical Exam  Vitals and nursing note reviewed.   Constitutional:       Appearance: Normal appearance.   HENT:      Head: Normocephalic.      Nose: Nose normal.      Mouth/Throat:      Mouth: Mucous membranes are moist.

## 2024-04-22 PROBLEM — Z87.440 HISTORY OF RECURRENT UTIS: Status: RESOLVED | Noted: 2024-03-18 | Resolved: 2024-04-22

## 2024-04-22 PROBLEM — R82.81 PYURIA: Status: RESOLVED | Noted: 2024-03-18 | Resolved: 2024-04-22

## 2024-04-22 PROBLEM — R31.0 GROSS HEMATURIA: Status: RESOLVED | Noted: 2024-03-18 | Resolved: 2024-04-22

## 2024-04-22 PROBLEM — Z85.51 HISTORY OF BLADDER CANCER: Status: RESOLVED | Noted: 2024-03-18 | Resolved: 2024-04-22

## 2024-04-22 PROBLEM — R35.0 FREQUENCY OF MICTURITION: Status: RESOLVED | Noted: 2024-03-18 | Resolved: 2024-04-22

## 2024-04-22 PROBLEM — D49.9: Status: RESOLVED | Noted: 2024-04-18 | Resolved: 2024-04-22

## 2024-04-22 PROBLEM — N30.91 CYSTITIS WITH HEMATURIA: Status: RESOLVED | Noted: 2024-03-17 | Resolved: 2024-04-22

## 2024-04-23 ENCOUNTER — INITIAL CONSULT (OUTPATIENT)
Age: 64
End: 2024-04-23
Payer: MEDICAID

## 2024-04-23 VITALS
DIASTOLIC BLOOD PRESSURE: 77 MMHG | RESPIRATION RATE: 14 BRPM | WEIGHT: 189 LBS | SYSTOLIC BLOOD PRESSURE: 111 MMHG | HEART RATE: 79 BPM | BODY MASS INDEX: 28.64 KG/M2 | OXYGEN SATURATION: 99 % | HEIGHT: 68 IN

## 2024-04-23 DIAGNOSIS — C67.8 MALIGNANT NEOPLASM OF OVERLAPPING SITES OF BLADDER (HCC): Primary | ICD-10-CM

## 2024-04-23 DIAGNOSIS — N30.40 RADIATION CYSTITIS: ICD-10-CM

## 2024-04-23 DIAGNOSIS — C53.8 MALIGNANT NEOPLASM OF OVERLAPPING SITES OF CERVIX (HCC): ICD-10-CM

## 2024-04-23 PROCEDURE — 99245 OFF/OP CONSLTJ NEW/EST HI 55: CPT | Performed by: UROLOGY

## 2024-04-23 NOTE — PROGRESS NOTES
HISTORY OF PRESENT ILLNESS  Daisy Michaud is a 63 y.o. female.   has a past medical history of Bladder cancer (HCC), Cervical cancer (HCC), and Thyroid disease.  has a past surgical history that includes Cystocopy (04/03/2024); Total vaginal hysterectomy; bladder tumor excision; Ureteral reimplantion (Bilateral); [place] tooth extraction; and Anomalous venous return repair (N/A, 4/5/2024).  Chief Complaint   Patient presents with    Surgical Consult     Cystectomy      She is here with her daughter-in-law who works in the EP lab.  She is referred by Dr. Jerome Abarca for bladder cancer.    Relevant past medical history includes h/o cervical cancer s/p hysterectomy 2006 and radiation therapy.    She also had age 7 and age 19 had ureteral surgery where it was moved.  It may have been ureteral reimplantation.      She was diagnosed with bladder cancer in 2017 or 2018 with Dr. Ramon Vazquez.  She was told it was resected with good margins.  She presented again more recently with micro hematuria.  She had frequency and urgency and was initially treated for a UTI by her PCP.  She states her urine has been clear yellow.  Her symptoms persisted.  She saw Jerome Abarca and had a TURBT.  She has T2HG bladder cancer, > 5cm area, multifocal.  CT 4/18/24 without lymphadenopathy.  Bladder persistently thickened on CT.     She has an episode every few years.  She can get lightheaded and nauseated.  She lies down.  It passes after 8 minutes.  She feels a little weak afterward.  She does not associate it with any activity or situation.  Yesterday she was with the grandkids.          1. Malignant neoplasm of overlapping sites of bladder (HCC)  Overview:  HX of bladder cancer, recurrent since 2018 or 2017 with Dr Vazquez.   She is s/p TURBT ([place]) 4/5/24; pathology significant for invasive high-grade urothelial carcinoma.   Assessment & Plan:  She has muscle invasive bladder cancer.  We discussed cystectomy, ileal conduit.  We also

## 2024-04-23 NOTE — PROGRESS NOTES
Chief Complaint   Patient presents with    Surgical Consult     Cystectomy      1. Have you been to the ER, urgent care clinic since your last visit?  Hospitalized since your last visit?No    2. Have you seen or consulted any other health care providers outside of the [facility] since your last visit?  Include any pap smears or colon screening. No  /77 (Site: Left Upper Arm, Position: Sitting, Cuff Size: Medium Adult)   Pulse 79   Resp 14   Ht 1.72 m (5' 7.72\")   Wt 85.7 kg (189 lb)   SpO2 99%   BMI 28.98 kg/m²

## 2024-04-23 NOTE — ASSESSMENT & PLAN NOTE
She has muscle invasive bladder cancer.  We discussed cystectomy, ileal conduit.  We also discussed neoadjuvant chemotherapy.  Additional radiation therapy may be contraindicated.      The patient has decided on cystectomy after personal research.  I would only recommend a limited to no lymph node dissection due to radiation likely to this area.    The patient was counseled on the risks, benefits and expected course of surgery. Surgery has risks of bleeding, infection, injury, pain, death or other consequences. Perioperative medications and antibiotic use were discussed including the potential for reactions and side effects. Some specific risks of surgery were discussed as well.  She is at increased risk of complication due to prior surgery and radiation.  We discussed the risk of bowel obstruction and chronic slow transit.  I discussed the possibility of incidental appendectomy if the appendix is in the area of operation.  She had the he has questions and feels comfortable with the plan.  She wishes to proceed.

## 2024-04-24 ENCOUNTER — PREP FOR PROCEDURE (OUTPATIENT)
Age: 64
End: 2024-04-24

## 2024-04-26 ENCOUNTER — TELEPHONE (OUTPATIENT)
Age: 64
End: 2024-04-26

## 2024-04-26 NOTE — TELEPHONE ENCOUNTER
Pt called in regards to the questions about insurance. She said that she did call and confirm about that she had Medicare and Medicaid effective May 1st. She said if you have any other questions to just give her a call.

## 2024-05-01 ENCOUNTER — TELEPHONE (OUTPATIENT)
Age: 64
End: 2024-05-01

## 2024-05-01 ENCOUNTER — HOSPITAL ENCOUNTER (OUTPATIENT)
Facility: HOSPITAL | Age: 64
Discharge: HOME | End: 2024-05-04
Payer: MEDICARE

## 2024-05-01 VITALS
WEIGHT: 190.8 LBS | OXYGEN SATURATION: 96 % | BODY MASS INDEX: 28.92 KG/M2 | HEART RATE: 85 BPM | DIASTOLIC BLOOD PRESSURE: 94 MMHG | SYSTOLIC BLOOD PRESSURE: 147 MMHG | TEMPERATURE: 97.9 F | HEIGHT: 68 IN | RESPIRATION RATE: 16 BRPM

## 2024-05-01 DIAGNOSIS — R30.0 DYSURIA: Primary | ICD-10-CM

## 2024-05-01 LAB
ABO + RH BLD: NORMAL
ANION GAP SERPL CALC-SCNC: 5 MMOL/L (ref 5–15)
APPEARANCE UR: ABNORMAL
BACTERIA URNS QL MICRO: ABNORMAL /HPF
BILIRUB UR QL: NEGATIVE
BLOOD GROUP ANTIBODIES SERPL: NEGATIVE
BUN SERPL-MCNC: 16 MG/DL (ref 6–20)
BUN/CREAT SERPL: 15 (ref 12–20)
CA-I BLD-MCNC: 9.6 MG/DL (ref 8.5–10.1)
CHLORIDE SERPL-SCNC: 109 MMOL/L (ref 97–108)
CO2 SERPL-SCNC: 26 MMOL/L (ref 21–32)
COLOR UR: ABNORMAL
CREAT SERPL-MCNC: 1.1 MG/DL (ref 0.55–1.02)
EKG ATRIAL RATE: 81 BPM
EKG DIAGNOSIS: NORMAL
EKG P AXIS: 37 DEGREES
EKG P-R INTERVAL: 148 MS
EKG Q-T INTERVAL: 372 MS
EKG QRS DURATION: 78 MS
EKG QTC CALCULATION (BAZETT): 432 MS
EKG R AXIS: -37 DEGREES
EKG T AXIS: 24 DEGREES
EKG VENTRICULAR RATE: 81 BPM
EPITH CASTS URNS QL MICRO: ABNORMAL /LPF
ERYTHROCYTE [DISTWIDTH] IN BLOOD BY AUTOMATED COUNT: 14.3 % (ref 11.5–14.5)
GLUCOSE SERPL-MCNC: 108 MG/DL (ref 65–100)
GLUCOSE UR STRIP.AUTO-MCNC: NEGATIVE MG/DL
HCT VFR BLD AUTO: 38.4 % (ref 35–47)
HGB BLD-MCNC: 12.7 G/DL (ref 11.5–16)
HGB UR QL STRIP: ABNORMAL
KETONES UR QL STRIP.AUTO: NEGATIVE MG/DL
LEUKOCYTE ESTERASE UR QL STRIP.AUTO: ABNORMAL
MCH RBC QN AUTO: 30.6 PG (ref 26–34)
MCHC RBC AUTO-ENTMCNC: 33.1 G/DL (ref 30–36.5)
MCV RBC AUTO: 92.5 FL (ref 80–99)
MRSA DNA SPEC QL NAA+PROBE: NOT DETECTED
MUCOUS THREADS URNS QL MICRO: ABNORMAL /LPF
NITRITE UR QL STRIP.AUTO: NEGATIVE
NRBC # BLD: 0 K/UL (ref 0–0.01)
NRBC BLD-RTO: 0 PER 100 WBC
OTHER: ABNORMAL
PH UR STRIP: 6 (ref 5–8)
PLATELET # BLD AUTO: 327 K/UL (ref 150–400)
PMV BLD AUTO: 11 FL (ref 8.9–12.9)
POTASSIUM SERPL-SCNC: 4.3 MMOL/L (ref 3.5–5.1)
PROT UR STRIP-MCNC: 100 MG/DL
RBC # BLD AUTO: 4.15 M/UL (ref 3.8–5.2)
RBC #/AREA URNS HPF: ABNORMAL /HPF (ref 0–5)
SODIUM SERPL-SCNC: 140 MMOL/L (ref 136–145)
SP GR UR REFRACTOMETRY: 1.01 (ref 1–1.03)
SPECIMEN EXP DATE BLD: NORMAL
URINE CULTURE IF INDICATED: ABNORMAL
UROBILINOGEN UR QL STRIP.AUTO: 0.1 EU/DL (ref 0.1–1)
WBC # BLD AUTO: 9.9 K/UL (ref 3.6–11)
WBC URNS QL MICRO: >100 /HPF (ref 0–4)

## 2024-05-01 PROCEDURE — 86900 BLOOD TYPING SEROLOGIC ABO: CPT

## 2024-05-01 PROCEDURE — 86901 BLOOD TYPING SEROLOGIC RH(D): CPT

## 2024-05-01 PROCEDURE — 87088 URINE BACTERIA CULTURE: CPT

## 2024-05-01 PROCEDURE — 36415 COLL VENOUS BLD VENIPUNCTURE: CPT

## 2024-05-01 PROCEDURE — 87086 URINE CULTURE/COLONY COUNT: CPT

## 2024-05-01 PROCEDURE — 87641 MR-STAPH DNA AMP PROBE: CPT

## 2024-05-01 PROCEDURE — 80048 BASIC METABOLIC PNL TOTAL CA: CPT

## 2024-05-01 PROCEDURE — 86850 RBC ANTIBODY SCREEN: CPT

## 2024-05-01 PROCEDURE — 81001 URINALYSIS AUTO W/SCOPE: CPT

## 2024-05-01 PROCEDURE — 71046 X-RAY EXAM CHEST 2 VIEWS: CPT

## 2024-05-01 PROCEDURE — 93005 ELECTROCARDIOGRAM TRACING: CPT | Performed by: UROLOGY

## 2024-05-01 PROCEDURE — 85027 COMPLETE CBC AUTOMATED: CPT

## 2024-05-01 RX ORDER — PHENAZOPYRIDINE HYDROCHLORIDE 100 MG/1
100 TABLET, FILM COATED ORAL 3 TIMES DAILY PRN
Qty: 15 TABLET | Refills: 0 | Status: SHIPPED | OUTPATIENT
Start: 2024-05-01 | End: 2024-05-06

## 2024-05-01 RX ORDER — CEPHALEXIN 250 MG/1
250 CAPSULE ORAL 2 TIMES DAILY
Qty: 10 CAPSULE | Refills: 0 | Status: SHIPPED | OUTPATIENT
Start: 2024-05-01 | End: 2024-05-06

## 2024-05-01 ASSESSMENT — PAIN DESCRIPTION - LOCATION: LOCATION: PELVIS

## 2024-05-01 ASSESSMENT — PAIN SCALES - GENERAL: PAINLEVEL_OUTOF10: 8

## 2024-05-01 ASSESSMENT — PAIN DESCRIPTION - PAIN TYPE: TYPE: ACUTE PAIN

## 2024-05-01 ASSESSMENT — PAIN DESCRIPTION - DESCRIPTORS: DESCRIPTORS: OTHER (COMMENT)

## 2024-05-01 ASSESSMENT — PAIN DESCRIPTION - ORIENTATION: ORIENTATION: MID

## 2024-05-01 NOTE — WOUND CARE
WCN met with patient and provided a education folder on urostomy care.  I reviewed the following:  -Measuring the stoma  -Cutting wafer to fit stoma and wafer and pouch application.  -Empty / burp pouch when 1/3-1/2 full of stool or gas.  -Change appliance (wafer and pouch) 2-3 per week.  If leaking, change as soon as possible to prevent skin irritation.  -Clean stoma and peristomal skin with plain water or NS, may use a mild soap.  No soaps with lotions as they may prevent adhesive from adhering to skin.  May bathe with appliance on or off.  -Stoma should always be red and moist.  If stoma appears dry and dusky, notify surgeon immediately.     Patient was shown a 2-piece pouch and a 1-piece pouch and difference between the two was explained.  I demonstrated how to access the education videos using a Smartphone and Wifi.  Patient was advised to review education material prior to surgery.  Surgery is scheduled for Monday 5/6/24.  All questions related to urostomy care were answered.  Please consult WCN post-op for education and pouching needs.

## 2024-05-01 NOTE — TELEPHONE ENCOUNTER
PAT today; turbid urine and pt complaining of burning.  Likely due to tumor burden with hx of negative growth on culture.    Will send RX for pyridium PRN and low dose keflex (antibiotic).  Sent to   [facility]/pharmacy #[id number] - [place] - [address] - P [phone] - F [phone]     Please have her read all directions.  Warn her that pyridium will turn her urine orange.  It will also stain her underwear.      Call me with questions.

## 2024-05-02 ENCOUNTER — TELEPHONE (OUTPATIENT)
Age: 64
End: 2024-05-02

## 2024-05-02 LAB
BACTERIA SPEC CULT: ABNORMAL
COLONY COUNT, CNT: ABNORMAL
Lab: ABNORMAL

## 2024-05-02 NOTE — TELEPHONE ENCOUNTER
Pt left vm stating she was having bladder spasms and was in extreme pain. She wanted to know if she could take the oxybutynin she had left over. After speaking with yaneli, I informed the pt yaneli said she could take the medication. She also said the rx directions from the pharmacy told her to take her pyridium BID, and I informed her our sig states for her to take it tid. She will call back if this doesn't help her symptoms.

## 2024-05-03 RX ORDER — POLYETHYLENE GLYCOL 3350, SODIUM SULFATE ANHYDROUS, SODIUM BICARBONATE, SODIUM CHLORIDE, POTASSIUM CHLORIDE 236; 22.74; 6.74; 5.86; 2.97 G/4L; G/4L; G/4L; G/4L; G/4L
4 POWDER, FOR SOLUTION ORAL ONCE
Qty: 4000 ML | Refills: 0 | Status: SHIPPED | OUTPATIENT
Start: 2024-05-03 | End: 2024-05-03

## 2024-05-05 ENCOUNTER — ANESTHESIA EVENT (OUTPATIENT)
Facility: HOSPITAL | Age: 64
End: 2024-05-05
Payer: MEDICARE

## 2024-05-05 NOTE — ANESTHESIA PRE PROCEDURE
3     (Standard ASA monitors: continuous EKG, BP, HR, pulse oximeter, temperature, and capnography.)  Induction: intravenous.  continuous noninvasive hemodynamic monitor and arterial line  MIPS: Postoperative opioids intended and Prophylactic antiemetics administered.  Anesthetic plan and risks discussed with patient (and family, if present.).    Use of blood products discussed with patient whom consented to blood products.    Plan discussed with CRNA.    Attending anesthesiologist reviewed and agrees with Preprocedure content              Ortiz Ceja MD   5/5/2024

## 2024-05-06 ENCOUNTER — HOSPITAL ENCOUNTER (INPATIENT)
Facility: HOSPITAL | Age: 64
LOS: 5 days | Discharge: HOME HEALTH | End: 2024-05-11
Attending: UROLOGY | Admitting: UROLOGY
Payer: MEDICARE

## 2024-05-06 ENCOUNTER — ANESTHESIA (OUTPATIENT)
Facility: HOSPITAL | Age: 64
End: 2024-05-06
Payer: MEDICARE

## 2024-05-06 DIAGNOSIS — C67.9 MALIGNANT NEOPLASM OF URINARY BLADDER, UNSPECIFIED SITE (HCC): Primary | ICD-10-CM

## 2024-05-06 DIAGNOSIS — C67.8 MALIGNANT NEOPLASM OF OVERLAPPING SITES OF BLADDER (HCC): ICD-10-CM

## 2024-05-06 DIAGNOSIS — C53.8 MALIGNANT NEOPLASM OF OVERLAPPING SITES OF CERVIX (HCC): ICD-10-CM

## 2024-05-06 PROCEDURE — 2000000000 HC ICU R&B

## 2024-05-06 PROCEDURE — 6360000002 HC RX W HCPCS: Performed by: NURSE PRACTITIONER

## 2024-05-06 PROCEDURE — 94761 N-INVAS EAR/PLS OXIMETRY MLT: CPT

## 2024-05-06 PROCEDURE — 2580000003 HC RX 258: Performed by: NURSE ANESTHETIST, CERTIFIED REGISTERED

## 2024-05-06 PROCEDURE — 6360000002 HC RX W HCPCS: Performed by: UROLOGY

## 2024-05-06 PROCEDURE — 6370000000 HC RX 637 (ALT 250 FOR IP): Performed by: NURSE PRACTITIONER

## 2024-05-06 PROCEDURE — C2617 STENT, NON-COR, TEM W/O DEL: HCPCS | Performed by: UROLOGY

## 2024-05-06 PROCEDURE — 0T174ZC BYPASS LEFT URETER TO ILEOCUTANEOUS, PERCUTANEOUS ENDOSCOPIC APPROACH: ICD-10-PCS | Performed by: UROLOGY

## 2024-05-06 PROCEDURE — 2580000003 HC RX 258: Performed by: UROLOGY

## 2024-05-06 PROCEDURE — C1889 IMPLANT/INSERT DEVICE, NOC: HCPCS | Performed by: UROLOGY

## 2024-05-06 PROCEDURE — 0DN84ZZ RELEASE SMALL INTESTINE, PERCUTANEOUS ENDOSCOPIC APPROACH: ICD-10-PCS | Performed by: UROLOGY

## 2024-05-06 PROCEDURE — S2900 ROBOTIC SURGICAL SYSTEM: HCPCS | Performed by: UROLOGY

## 2024-05-06 PROCEDURE — 2500000003 HC RX 250 WO HCPCS: Performed by: NURSE ANESTHETIST, CERTIFIED REGISTERED

## 2024-05-06 PROCEDURE — 6360000002 HC RX W HCPCS: Performed by: NURSE ANESTHETIST, CERTIFIED REGISTERED

## 2024-05-06 PROCEDURE — 7100000000 HC PACU RECOVERY - FIRST 15 MIN: Performed by: UROLOGY

## 2024-05-06 PROCEDURE — 2500000003 HC RX 250 WO HCPCS: Performed by: NURSE PRACTITIONER

## 2024-05-06 PROCEDURE — 88331 PATH CONSLTJ SURG 1 BLK 1SPC: CPT

## 2024-05-06 PROCEDURE — 88311 DECALCIFY TISSUE: CPT

## 2024-05-06 PROCEDURE — 3600000009 HC SURGERY ROBOT BASE: Performed by: UROLOGY

## 2024-05-06 PROCEDURE — 3700000000 HC ANESTHESIA ATTENDED CARE: Performed by: UROLOGY

## 2024-05-06 PROCEDURE — 2700000000 HC OXYGEN THERAPY PER DAY

## 2024-05-06 PROCEDURE — 51590 REMOVE BLADDER/REVISE TRACT: CPT | Performed by: UROLOGY

## 2024-05-06 PROCEDURE — 0TTB4ZZ RESECTION OF BLADDER, PERCUTANEOUS ENDOSCOPIC APPROACH: ICD-10-PCS | Performed by: UROLOGY

## 2024-05-06 PROCEDURE — 88305 TISSUE EXAM BY PATHOLOGIST: CPT

## 2024-05-06 PROCEDURE — 52332 CYSTOSCOPY AND TREATMENT: CPT | Performed by: UROLOGY

## 2024-05-06 PROCEDURE — 2720000010 HC SURG SUPPLY STERILE: Performed by: UROLOGY

## 2024-05-06 PROCEDURE — 58660 LAPAROSCOPY LYSIS: CPT | Performed by: UROLOGY

## 2024-05-06 PROCEDURE — 2709999900 HC NON-CHARGEABLE SUPPLY: Performed by: UROLOGY

## 2024-05-06 PROCEDURE — 88307 TISSUE EXAM BY PATHOLOGIST: CPT

## 2024-05-06 PROCEDURE — 88309 TISSUE EXAM BY PATHOLOGIST: CPT

## 2024-05-06 PROCEDURE — 3700000001 HC ADD 15 MINUTES (ANESTHESIA): Performed by: UROLOGY

## 2024-05-06 PROCEDURE — 7100000001 HC PACU RECOVERY - ADDTL 15 MIN: Performed by: UROLOGY

## 2024-05-06 PROCEDURE — C1769 GUIDE WIRE: HCPCS | Performed by: UROLOGY

## 2024-05-06 PROCEDURE — 8E0W4CZ ROBOTIC ASSISTED PROCEDURE OF TRUNK REGION, PERCUTANEOUS ENDOSCOPIC APPROACH: ICD-10-PCS | Performed by: UROLOGY

## 2024-05-06 PROCEDURE — 3600000019 HC SURGERY ROBOT ADDTL 15MIN: Performed by: UROLOGY

## 2024-05-06 PROCEDURE — 87086 URINE CULTURE/COLONY COUNT: CPT

## 2024-05-06 DEVICE — HEMOLOK L 6 CLIPS/CART
Type: IMPLANTABLE DEVICE | Site: ABDOMEN | Status: FUNCTIONAL
Brand: WECK

## 2024-05-06 DEVICE — URETERAL STENT
Type: IMPLANTABLE DEVICE | Site: ABDOMEN | Status: FUNCTIONAL
Brand: PERCUFLEX™ PLUS

## 2024-05-06 DEVICE — ALLOGRAFT HUMAN TISSUE STRAVIX PL 8SQCM 2CM X 4CM: Type: IMPLANTABLE DEVICE | Site: URETER | Status: FUNCTIONAL

## 2024-05-06 RX ORDER — MIDAZOLAM HYDROCHLORIDE 1 MG/ML
INJECTION INTRAMUSCULAR; INTRAVENOUS PRN
Status: DISCONTINUED | OUTPATIENT
Start: 2024-05-06 | End: 2024-05-06 | Stop reason: SDUPTHER

## 2024-05-06 RX ORDER — FENTANYL CITRATE 50 UG/ML
INJECTION, SOLUTION INTRAMUSCULAR; INTRAVENOUS PRN
Status: DISCONTINUED | OUTPATIENT
Start: 2024-05-06 | End: 2024-05-06 | Stop reason: SDUPTHER

## 2024-05-06 RX ORDER — PROPOFOL 10 MG/ML
INJECTION, EMULSION INTRAVENOUS PRN
Status: DISCONTINUED | OUTPATIENT
Start: 2024-05-06 | End: 2024-05-06 | Stop reason: SDUPTHER

## 2024-05-06 RX ORDER — BUPIVACAINE HYDROCHLORIDE 2.5 MG/ML
INJECTION, SOLUTION EPIDURAL; INFILTRATION; INTRACAUDAL PRN
Status: DISCONTINUED | OUTPATIENT
Start: 2024-05-06 | End: 2024-05-06 | Stop reason: HOSPADM

## 2024-05-06 RX ORDER — SODIUM CHLORIDE, SODIUM LACTATE, POTASSIUM CHLORIDE, CALCIUM CHLORIDE 600; 310; 30; 20 MG/100ML; MG/100ML; MG/100ML; MG/100ML
INJECTION, SOLUTION INTRAVENOUS CONTINUOUS
Status: DISCONTINUED | OUTPATIENT
Start: 2024-05-06 | End: 2024-05-06 | Stop reason: HOSPADM

## 2024-05-06 RX ORDER — ONDANSETRON 2 MG/ML
4 INJECTION INTRAMUSCULAR; INTRAVENOUS EVERY 6 HOURS PRN
Status: DISCONTINUED | OUTPATIENT
Start: 2024-05-06 | End: 2024-05-11 | Stop reason: HOSPADM

## 2024-05-06 RX ORDER — 0.9 % SODIUM CHLORIDE 0.9 %
1000 INTRAVENOUS SOLUTION INTRAVENOUS ONCE
Status: DISCONTINUED | OUTPATIENT
Start: 2024-05-06 | End: 2024-05-06 | Stop reason: HOSPADM

## 2024-05-06 RX ORDER — PHENYLEPHRINE HCL IN 0.9% NACL 1 MG/10 ML
SYRINGE (ML) INTRAVENOUS PRN
Status: DISCONTINUED | OUTPATIENT
Start: 2024-05-06 | End: 2024-05-06 | Stop reason: SDUPTHER

## 2024-05-06 RX ORDER — SODIUM CHLORIDE 0.9 % (FLUSH) 0.9 %
5-40 SYRINGE (ML) INJECTION PRN
Status: DISCONTINUED | OUTPATIENT
Start: 2024-05-06 | End: 2024-05-06 | Stop reason: HOSPADM

## 2024-05-06 RX ORDER — LEVOFLOXACIN 5 MG/ML
750 INJECTION, SOLUTION INTRAVENOUS ONCE
Status: COMPLETED | OUTPATIENT
Start: 2024-05-06 | End: 2024-05-06

## 2024-05-06 RX ORDER — LIDOCAINE 4 G/G
1 PATCH TOPICAL DAILY
Status: DISCONTINUED | OUTPATIENT
Start: 2024-05-06 | End: 2024-05-11 | Stop reason: HOSPADM

## 2024-05-06 RX ORDER — DEXTROSE MONOHYDRATE 100 MG/ML
INJECTION, SOLUTION INTRAVENOUS CONTINUOUS PRN
Status: DISCONTINUED | OUTPATIENT
Start: 2024-05-06 | End: 2024-05-06 | Stop reason: HOSPADM

## 2024-05-06 RX ORDER — LEVOFLOXACIN 5 MG/ML
500 INJECTION, SOLUTION INTRAVENOUS EVERY 24 HOURS
Status: COMPLETED | OUTPATIENT
Start: 2024-05-07 | End: 2024-05-11

## 2024-05-06 RX ORDER — OXYCODONE HYDROCHLORIDE 5 MG/1
10 TABLET ORAL PRN
Status: DISCONTINUED | OUTPATIENT
Start: 2024-05-06 | End: 2024-05-06 | Stop reason: HOSPADM

## 2024-05-06 RX ORDER — IPRATROPIUM BROMIDE AND ALBUTEROL SULFATE 2.5; .5 MG/3ML; MG/3ML
1 SOLUTION RESPIRATORY (INHALATION)
Status: DISCONTINUED | OUTPATIENT
Start: 2024-05-06 | End: 2024-05-06 | Stop reason: HOSPADM

## 2024-05-06 RX ORDER — HYDROMORPHONE HYDROCHLORIDE 1 MG/ML
0.5 INJECTION, SOLUTION INTRAMUSCULAR; INTRAVENOUS; SUBCUTANEOUS EVERY 5 MIN PRN
Status: DISCONTINUED | OUTPATIENT
Start: 2024-05-06 | End: 2024-05-06 | Stop reason: HOSPADM

## 2024-05-06 RX ORDER — FUROSEMIDE 10 MG/ML
INJECTION INTRAMUSCULAR; INTRAVENOUS PRN
Status: DISCONTINUED | OUTPATIENT
Start: 2024-05-06 | End: 2024-05-06 | Stop reason: SDUPTHER

## 2024-05-06 RX ORDER — PROCHLORPERAZINE EDISYLATE 5 MG/ML
10 INJECTION INTRAMUSCULAR; INTRAVENOUS EVERY 6 HOURS PRN
Status: DISCONTINUED | OUTPATIENT
Start: 2024-05-06 | End: 2024-05-11 | Stop reason: HOSPADM

## 2024-05-06 RX ORDER — ONDANSETRON 2 MG/ML
4 INJECTION INTRAMUSCULAR; INTRAVENOUS
Status: DISCONTINUED | OUTPATIENT
Start: 2024-05-06 | End: 2024-05-06 | Stop reason: HOSPADM

## 2024-05-06 RX ORDER — SODIUM CHLORIDE, SODIUM LACTATE, POTASSIUM CHLORIDE, CALCIUM CHLORIDE 600; 310; 30; 20 MG/100ML; MG/100ML; MG/100ML; MG/100ML
INJECTION, SOLUTION INTRAVENOUS ONCE
Status: DISCONTINUED | OUTPATIENT
Start: 2024-05-06 | End: 2024-05-06 | Stop reason: HOSPADM

## 2024-05-06 RX ORDER — OXYCODONE HYDROCHLORIDE 5 MG/1
5 TABLET ORAL PRN
Status: DISCONTINUED | OUTPATIENT
Start: 2024-05-06 | End: 2024-05-06 | Stop reason: HOSPADM

## 2024-05-06 RX ORDER — HYDRALAZINE HYDROCHLORIDE 20 MG/ML
10 INJECTION INTRAMUSCULAR; INTRAVENOUS
Status: DISCONTINUED | OUTPATIENT
Start: 2024-05-06 | End: 2024-05-06 | Stop reason: HOSPADM

## 2024-05-06 RX ORDER — SODIUM CHLORIDE 9 MG/ML
INJECTION, SOLUTION INTRAVENOUS PRN
Status: DISCONTINUED | OUTPATIENT
Start: 2024-05-06 | End: 2024-05-06 | Stop reason: HOSPADM

## 2024-05-06 RX ORDER — ONDANSETRON 2 MG/ML
INJECTION INTRAMUSCULAR; INTRAVENOUS PRN
Status: DISCONTINUED | OUTPATIENT
Start: 2024-05-06 | End: 2024-05-06 | Stop reason: SDUPTHER

## 2024-05-06 RX ORDER — LABETALOL HYDROCHLORIDE 5 MG/ML
10 INJECTION, SOLUTION INTRAVENOUS
Status: DISCONTINUED | OUTPATIENT
Start: 2024-05-06 | End: 2024-05-06 | Stop reason: HOSPADM

## 2024-05-06 RX ORDER — LEVOFLOXACIN 5 MG/ML
INJECTION, SOLUTION INTRAVENOUS
Status: COMPLETED
Start: 2024-05-06 | End: 2024-05-06

## 2024-05-06 RX ORDER — FENTANYL CITRATE 50 UG/ML
50 INJECTION, SOLUTION INTRAMUSCULAR; INTRAVENOUS EVERY 5 MIN PRN
Status: DISCONTINUED | OUTPATIENT
Start: 2024-05-06 | End: 2024-05-06 | Stop reason: HOSPADM

## 2024-05-06 RX ORDER — LIDOCAINE 4 G/G
1 PATCH TOPICAL AS NEEDED
Status: DISCONTINUED | OUTPATIENT
Start: 2024-05-06 | End: 2024-05-06 | Stop reason: HOSPADM

## 2024-05-06 RX ORDER — HYDRALAZINE HYDROCHLORIDE 20 MG/ML
5 INJECTION INTRAMUSCULAR; INTRAVENOUS EVERY 6 HOURS PRN
Status: DISCONTINUED | OUTPATIENT
Start: 2024-05-06 | End: 2024-05-11 | Stop reason: HOSPADM

## 2024-05-06 RX ORDER — HYDROMORPHONE HYDROCHLORIDE 1 MG/ML
0.5 INJECTION, SOLUTION INTRAMUSCULAR; INTRAVENOUS; SUBCUTANEOUS
Status: DISCONTINUED | OUTPATIENT
Start: 2024-05-06 | End: 2024-05-07

## 2024-05-06 RX ORDER — ACETAMINOPHEN 325 MG/1
650 TABLET ORAL EVERY 4 HOURS PRN
Status: DISCONTINUED | OUTPATIENT
Start: 2024-05-06 | End: 2024-05-11 | Stop reason: HOSPADM

## 2024-05-06 RX ORDER — DEXTROSE MONOHYDRATE, SODIUM CHLORIDE, AND POTASSIUM CHLORIDE 50; 1.49; 4.5 G/1000ML; G/1000ML; G/1000ML
INJECTION, SOLUTION INTRAVENOUS CONTINUOUS
Status: DISCONTINUED | OUTPATIENT
Start: 2024-05-06 | End: 2024-05-11 | Stop reason: HOSPADM

## 2024-05-06 RX ORDER — SODIUM CHLORIDE 0.9 % (FLUSH) 0.9 %
5-40 SYRINGE (ML) INJECTION EVERY 12 HOURS SCHEDULED
Status: DISCONTINUED | OUTPATIENT
Start: 2024-05-06 | End: 2024-05-06 | Stop reason: HOSPADM

## 2024-05-06 RX ORDER — DIPHENHYDRAMINE HYDROCHLORIDE 50 MG/ML
12.5 INJECTION INTRAMUSCULAR; INTRAVENOUS
Status: DISCONTINUED | OUTPATIENT
Start: 2024-05-06 | End: 2024-05-06 | Stop reason: HOSPADM

## 2024-05-06 RX ORDER — SODIUM CHLORIDE 9 MG/ML
INJECTION, SOLUTION INTRAVENOUS CONTINUOUS PRN
Status: DISCONTINUED | OUTPATIENT
Start: 2024-05-06 | End: 2024-05-06 | Stop reason: SDUPTHER

## 2024-05-06 RX ORDER — METRONIDAZOLE 500 MG/100ML
500 INJECTION, SOLUTION INTRAVENOUS EVERY 8 HOURS
Status: COMPLETED | OUTPATIENT
Start: 2024-05-06 | End: 2024-05-09

## 2024-05-06 RX ORDER — LIDOCAINE HYDROCHLORIDE 20 MG/ML
INJECTION, SOLUTION EPIDURAL; INFILTRATION; INTRACAUDAL; PERINEURAL PRN
Status: DISCONTINUED | OUTPATIENT
Start: 2024-05-06 | End: 2024-05-06 | Stop reason: SDUPTHER

## 2024-05-06 RX ORDER — ROCURONIUM BROMIDE 10 MG/ML
INJECTION, SOLUTION INTRAVENOUS PRN
Status: DISCONTINUED | OUTPATIENT
Start: 2024-05-06 | End: 2024-05-06 | Stop reason: SDUPTHER

## 2024-05-06 RX ORDER — LEVOTHYROXINE SODIUM 20 UG/ML
50 INJECTION, SOLUTION INTRAVENOUS DAILY
Status: DISCONTINUED | OUTPATIENT
Start: 2024-05-07 | End: 2024-05-06

## 2024-05-06 RX ORDER — DEXAMETHASONE SODIUM PHOSPHATE 4 MG/ML
INJECTION, SOLUTION INTRA-ARTICULAR; INTRALESIONAL; INTRAMUSCULAR; INTRAVENOUS; SOFT TISSUE PRN
Status: DISCONTINUED | OUTPATIENT
Start: 2024-05-06 | End: 2024-05-06 | Stop reason: SDUPTHER

## 2024-05-06 RX ORDER — LORAZEPAM 2 MG/ML
0.5 INJECTION INTRAMUSCULAR
Status: DISCONTINUED | OUTPATIENT
Start: 2024-05-06 | End: 2024-05-06 | Stop reason: HOSPADM

## 2024-05-06 RX ORDER — NALOXONE HYDROCHLORIDE 0.4 MG/ML
INJECTION, SOLUTION INTRAMUSCULAR; INTRAVENOUS; SUBCUTANEOUS PRN
Status: DISCONTINUED | OUTPATIENT
Start: 2024-05-06 | End: 2024-05-06 | Stop reason: HOSPADM

## 2024-05-06 RX ORDER — FAMOTIDINE 10 MG/ML
20 INJECTION, SOLUTION INTRAVENOUS 2 TIMES DAILY
Status: DISCONTINUED | OUTPATIENT
Start: 2024-05-06 | End: 2024-05-10

## 2024-05-06 RX ORDER — METOCLOPRAMIDE HYDROCHLORIDE 5 MG/ML
10 INJECTION INTRAMUSCULAR; INTRAVENOUS
Status: DISCONTINUED | OUTPATIENT
Start: 2024-05-06 | End: 2024-05-06 | Stop reason: HOSPADM

## 2024-05-06 RX ADMIN — SUGAMMADEX 200 MG: 100 INJECTION, SOLUTION INTRAVENOUS at 13:32

## 2024-05-06 RX ADMIN — FAMOTIDINE 20 MG: 10 INJECTION, SOLUTION INTRAVENOUS at 14:43

## 2024-05-06 RX ADMIN — HYDROMORPHONE HYDROCHLORIDE 0.5 MG: 1 INJECTION, SOLUTION INTRAMUSCULAR; INTRAVENOUS; SUBCUTANEOUS at 22:26

## 2024-05-06 RX ADMIN — HYDROMORPHONE HYDROCHLORIDE 0.25 MG: 1 INJECTION, SOLUTION INTRAMUSCULAR; INTRAVENOUS; SUBCUTANEOUS at 09:28

## 2024-05-06 RX ADMIN — ONDANSETRON 4 MG: 2 INJECTION INTRAMUSCULAR; INTRAVENOUS at 13:03

## 2024-05-06 RX ADMIN — ROCURONIUM BROMIDE 50 MG: 50 INJECTION INTRAVENOUS at 07:45

## 2024-05-06 RX ADMIN — HYDROMORPHONE HYDROCHLORIDE 0.25 MG: 1 INJECTION, SOLUTION INTRAMUSCULAR; INTRAVENOUS; SUBCUTANEOUS at 09:08

## 2024-05-06 RX ADMIN — ROCURONIUM BROMIDE 50 MG: 50 INJECTION INTRAVENOUS at 08:46

## 2024-05-06 RX ADMIN — FENTANYL CITRATE 50 MCG: 50 INJECTION, SOLUTION INTRAMUSCULAR; INTRAVENOUS at 07:44

## 2024-05-06 RX ADMIN — Medication 50 MCG: at 11:14

## 2024-05-06 RX ADMIN — METRONIDAZOLE 500 MG: 500 INJECTION, SOLUTION INTRAVENOUS at 07:28

## 2024-05-06 RX ADMIN — POTASSIUM CHLORIDE, DEXTROSE MONOHYDRATE AND SODIUM CHLORIDE: 150; 5; 450 INJECTION, SOLUTION INTRAVENOUS at 14:31

## 2024-05-06 RX ADMIN — FENTANYL CITRATE 50 MCG: 50 INJECTION, SOLUTION INTRAMUSCULAR; INTRAVENOUS at 08:09

## 2024-05-06 RX ADMIN — SODIUM CHLORIDE: 9 INJECTION, SOLUTION INTRAVENOUS at 07:30

## 2024-05-06 RX ADMIN — ONDANSETRON 4 MG: 2 INJECTION INTRAMUSCULAR; INTRAVENOUS at 20:34

## 2024-05-06 RX ADMIN — LIDOCAINE HYDROCHLORIDE 100 MG: 20 INJECTION, SOLUTION EPIDURAL; INFILTRATION; INTRACAUDAL; PERINEURAL at 07:43

## 2024-05-06 RX ADMIN — MIDAZOLAM HYDROCHLORIDE 2 MG: 2 INJECTION, SOLUTION INTRAMUSCULAR; INTRAVENOUS at 07:31

## 2024-05-06 RX ADMIN — ROCURONIUM BROMIDE 20 MG: 50 INJECTION INTRAVENOUS at 10:29

## 2024-05-06 RX ADMIN — HYDROMORPHONE HYDROCHLORIDE 0.5 MG: 1 INJECTION, SOLUTION INTRAMUSCULAR; INTRAVENOUS; SUBCUTANEOUS at 08:19

## 2024-05-06 RX ADMIN — ROCURONIUM BROMIDE 30 MG: 50 INJECTION INTRAVENOUS at 11:34

## 2024-05-06 RX ADMIN — METRONIDAZOLE 500 MG: 500 INJECTION, SOLUTION INTRAVENOUS at 16:28

## 2024-05-06 RX ADMIN — ONDANSETRON 4 MG: 2 INJECTION INTRAMUSCULAR; INTRAVENOUS at 15:07

## 2024-05-06 RX ADMIN — SODIUM CHLORIDE, POTASSIUM CHLORIDE, SODIUM LACTATE AND CALCIUM CHLORIDE: 600; 310; 30; 20 INJECTION, SOLUTION INTRAVENOUS at 07:35

## 2024-05-06 RX ADMIN — PROPOFOL 160 MG: 10 INJECTION, EMULSION INTRAVENOUS at 07:44

## 2024-05-06 RX ADMIN — SODIUM CHLORIDE: 9 INJECTION, SOLUTION INTRAVENOUS at 08:03

## 2024-05-06 RX ADMIN — DEXAMETHASONE SODIUM PHOSPHATE 4 MG: 4 INJECTION, SOLUTION INTRA-ARTICULAR; INTRALESIONAL; INTRAMUSCULAR; INTRAVENOUS; SOFT TISSUE at 08:02

## 2024-05-06 RX ADMIN — POTASSIUM CHLORIDE, DEXTROSE MONOHYDRATE AND SODIUM CHLORIDE: 150; 5; 450 INJECTION, SOLUTION INTRAVENOUS at 22:29

## 2024-05-06 RX ADMIN — HYDROMORPHONE HYDROCHLORIDE 0.25 MG: 1 INJECTION, SOLUTION INTRAMUSCULAR; INTRAVENOUS; SUBCUTANEOUS at 11:43

## 2024-05-06 RX ADMIN — HYDROMORPHONE HYDROCHLORIDE 0.5 MG: 1 INJECTION, SOLUTION INTRAMUSCULAR; INTRAVENOUS; SUBCUTANEOUS at 19:13

## 2024-05-06 RX ADMIN — HYDROMORPHONE HYDROCHLORIDE 0.5 MG: 1 INJECTION, SOLUTION INTRAMUSCULAR; INTRAVENOUS; SUBCUTANEOUS at 16:04

## 2024-05-06 RX ADMIN — FUROSEMIDE 10 MG: 10 INJECTION, SOLUTION INTRAMUSCULAR; INTRAVENOUS at 13:18

## 2024-05-06 RX ADMIN — FAMOTIDINE 20 MG: 10 INJECTION, SOLUTION INTRAVENOUS at 20:34

## 2024-05-06 RX ADMIN — LEVOFLOXACIN 750 MG: 5 INJECTION, SOLUTION INTRAVENOUS at 07:45

## 2024-05-06 ASSESSMENT — PAIN SCALES - GENERAL
PAINLEVEL_OUTOF10: 8
PAINLEVEL_OUTOF10: 8

## 2024-05-06 ASSESSMENT — PAIN - FUNCTIONAL ASSESSMENT: PAIN_FUNCTIONAL_ASSESSMENT: 0-10

## 2024-05-06 NOTE — ANESTHESIA POSTPROCEDURE EVALUATION
Department of Anesthesiology  Postprocedure Note    Patient: Daisy Michaud  MRN: [id number]  YOB: 1960  Date of evaluation: 5/6/2024    Procedure Summary       Date: 05/06/24 Room / Location: [facility] MAIN OR 06 / [facility] MAIN OR    Anesthesia Start: 0730 Anesthesia Stop: 1401    Procedure: ROBOTIC ASSISTED LAPAROSCOPIC CYSTECTOMY WITH URETEROILEAL CONDUIT INCLUDING INTESTINE ANASTOMOSIS AND LYSIS OF ADHESIONS (Abdomen) Diagnosis:       Malignant neoplasm of overlapping sites of bladder (HCC)      (Malignant neoplasm of overlapping sites of bladder (HCC) [C67.8])    Surgeons: Rakesh Joseph MD Responsible Provider: Ortiz Ceja MD    Anesthesia Type: General ASA Status: 3            Anesthesia Type: General    Leilani Phase I: Leilani Score: 10    Leilani Phase II:      Anesthesia Post Evaluation    Patient location during evaluation: ICU  Patient participation: complete - patient participated  Level of consciousness: sleepy but conscious  Pain score: 0  Airway patency: patent  Nausea & Vomiting: no nausea and no vomiting  Cardiovascular status: blood pressure returned to baseline and hemodynamically stable  Respiratory status: acceptable, spontaneous ventilation, oral airway and nasal cannula  Hydration status: stable  Comments: Patient transported to ICU via 2LPM NC w/ adequate spont respirations, follows simple commands (open mouth/eyes). Transport w/o issue. Report given to ICU team. Patient moving all 4 extremities and arousable to voice.VSS.  Pain management: adequate    No notable events documented.

## 2024-05-06 NOTE — OP NOTE
Operative Note      Patient: Daisy Michaud  YOB: 1960  MRN: [id number]    Date of Procedure: 5/6/2024    Pre-Op Diagnosis Codes:     * Malignant neoplasm of overlapping sites of bladder (HCC) [C67.8]    Post-Op Diagnosis: Same       Procedure(s):  ROBOTIC ASSISTED LAPAROSCOPIC CYSTECTOMY WITH URETEROILEAL CONDUIT INCLUDING INTESTINE ANASTOMOSIS AND LYSIS OF ADHESIONS    Surgeon(s):  Rakesh Joseph MD Park, Eugene, MD    Assistant:   Surgical Assistant: Giles Muller    Anesthesia: General    Estimated Blood Loss (mL): less than 100     Complications: None    Specimens:   ID Type Source Tests Collected by Time Destination   1 : LEFT BLADDER PEDICULE MARGIN Tissue Bladder SURGICAL PATHOLOGY Rakesh Joseph MD 5/6/2024 0938    2 : BLADDER Tissue Bladder SURGICAL PATHOLOGY Rakesh Joseph MD 5/6/2024 1007    3 : LEFT DISTAL URETER CLIP ON DISTAL MARGIN Tissue Ureter SURGICAL PATHOLOGY Rakesh Joseph MD 5/6/2024 1209    4 : DISTAL RIGHT URETER Tissue Ureter SURGICAL PATHOLOGY Rakesh Joseph MD 5/6/2024 1237    A : URINE CULTURE Urine Urine, indwelling catheter CULTURE, URINE Rakesh Joseph MD 5/6/2024 0808        Implants:  Implant Name Type Inv. Item Serial No.  Lot No. LRB No. Used Action   CLIP INT L POLYMER ROSIBEL LIG HEM O ROSIBEL (6EA/PK) - SNA  CLIP INT L POLYMER ROSIBEL LIG HEM O ROSIBEL (6EA/PK) NA [employer] [id number] N/A 1 Implanted   STENT URET L26CM DIA8FR HYDR+ TAPR TIP PGTL L SIDEPRT - [id number]  STENT URET L26CM DIA8FR HYDR+ TAPR TIP PGTL L SIDEPRT  [employer] [facility] [id number] Left 1 Implanted   STENT URET L26CM DIA8FR HYDR+ TAPR TIP PGTL L SIDEPRT - [id number]  STENT URET L26CM DIA8FR HYDR+ TAPR TIP PGTL L SIDEPRT  [employer] [facility] UROLOG[facility]- [id number] Right 1 Implanted   ALLOGRAFT HUMAN TISSUE STRAVIX PL 8SQCM 2CM X 4CM - [id number]  ALLOGRAFT HUMAN TISSUE STRAVIX PL 8SQCM 2CM X 4CM [id number] [employer]- [id number] Left 1 Implanted         Drains:   Closed/Suction Drain RLQ Other (Comment)  tacked in place with the preplaced sutures.    The fascia was closed with 0 PDS suture.  The skin incisions were closed with 4-0 Monocryl subcuticular sutures and Dermabond skin glue.    The stoma was matured with 2-0 Vicryl interrupted sutures. A rosebud was created within limitation due to body habitus. A 18F Zhao was placed in the stoma with 3 cc in the balloon.  A stoma appliance was applied.  The drain was placed to bulb suction.     At the end of the procedure all the counts were correct.    The patient tolerated the procedure well and was transported in stable to the recovery.        Rakesh Joseph MD          Electronically signed by Rakesh Joseph MD on 5/6/2024 at 2:02 PM

## 2024-05-06 NOTE — H&P
UROLOGY HISTORY AND PHYSICAL  Rakesh Joseph MD  [phone] Office    Patient: Daisy Michaud MRN: [id number]  SSN: [id number]    YOB: 1960  Age: 63 y.o.  Sex: female          Date of Encounter:  May 6, 2024  Chief Complaint:  bladder cancer             History of Present Illness:  Patient is a 63 y.o. female here for surgery.    She is here with her daughter-in-law who works in the [employer] lab.       Relevant past medical history includes h/o cervical cancer s/p hysterectomy 2006 and radiation therapy.    She also had age 7 and age 19 had ureteral surgery where it was moved.  It may have been ureteral reimplantation.       She was diagnosed with bladder cancer in 2017 or 2018 with Dr. Ramon Vazquez.  She was told it was resected with good margins.  She presented again more recently with micro hematuria.  She had frequency and urgency and was initially treated for a UTI by her PCP.  She states her urine has been clear yellow.  Her symptoms persisted.  She saw Jerome Abarca and had a TURBT.  She has T2HG bladder cancer, > 5cm area, multifocal.  CT 4/18/24 without lymphadenopathy.  Bladder persistently thickened on CT.      She has an episode every few years.  She can get lightheaded and nauseated.  She lies down.  It passes after 8 minutes.  She feels a little weak afterward.  She does not associate it with any activity or situation.  Yesterday she was with the [employer].         Past Medical History:  Allergies   Allergen Reactions    Codeine Nausea Only      Prior to Admission medications    Medication Sig Start Date End Date Taking? Authorizing Provider   phenazopyridine (PYRIDIUM) 100 MG tablet Take 1 tablet by mouth 3 times daily as needed for Pain 5/1/24 5/6/24  Marii Chand APRN - NP   cephALEXin (KEFLEX) 250 MG capsule Take 1 capsule by mouth 2 times daily for 5 days 5/1/24 5/6/24  Marii Chand APRN - NP   Cetirizine HCl (ZYRTEC ALLERGY) 10 MG CAPS Take 10 mg by mouth  daily    Provider, MD Byron   estrogens conjugated (PREMARIN) 0.625 MG/GM CREA vaginal cream Apply pea size of cream  once a day on affected area on Monday,Wednesday,Friday  Patient not taking: Reported on 2024 3/25/24   Blanca Chavarria, APRN - NP   levothyroxine (SYNTHROID) 100 MCG tablet Take 1 tablet by mouth Daily 3/9/24   ProviderByron MD      PMHx:  has a past medical history of Arthritis, Bladder cancer (HCC), Cervical cancer (HCC), and Thyroid disease.   PSurgHx:  has a past surgical history that includes Cystocopy (2024); Total vaginal hysterectomy; bladder tumor excision; Ureteral reimplantion (Bilateral); [place] tooth extraction; and Anomalous venous return repair (N/A, 2024).  PSocHx:  reports that she has quit smoking. Her smoking use included cigarettes. She has never used smokeless tobacco. She reports that she does not currently use alcohol. She reports current drug use. Drug: Marijuana (Weed).   FamHx:   Family History   Problem Relation Age of Onset    Stomach Cancer Father      ROS:  Admission ROS by Rakesh Joseph MD from 2024 were reviewed with the patient and changes (other than per HPI) include: none.    Physical Exam:            Vitals::    Temp (24hrs), Av.8 °F (37.1 °C), Min:98.8 °F (37.1 °C), Max:98.8 °F (37.1 °C)   Blood pressure 124/69, pulse 64, temperature 98.8 °F (37.1 °C), temperature source Oral, resp. rate 18, SpO2 98 %.   Estimated body mass index is 29.01 kg/m² as calculated from the following:    Height as of 24: 1.727 m (5' 8\").    Weight as of 24: 86.5 kg (190 lb 12.8 oz).             I&O's:    No intake/output data recorded.   General Well developed, in NAD   Conjunctiva/Lids Normal without gross defects   Neck Supple without obvious, masses   Respiratory Effort Breathing easily, no audible wheezing, rhonchi, stridor   CV RRR   Abdomen / Flank Soft, non tender without guarding or rebound, without obvious masses, no CVA

## 2024-05-07 LAB
ALBUMIN SERPL-MCNC: 3 G/DL (ref 3.5–5)
ALBUMIN/GLOB SERPL: 0.9 (ref 1.1–2.2)
ALP SERPL-CCNC: 61 U/L (ref 45–117)
ALT SERPL-CCNC: 26 U/L (ref 12–78)
ANION GAP SERPL CALC-SCNC: 6 MMOL/L (ref 5–15)
ANION GAP SERPL CALC-SCNC: 6 MMOL/L (ref 5–15)
AST SERPL W P-5'-P-CCNC: 19 U/L (ref 15–37)
BACTERIA SPEC CULT: NORMAL
BILIRUB SERPL-MCNC: 0.4 MG/DL (ref 0.2–1)
BUN SERPL-MCNC: 12 MG/DL (ref 6–20)
BUN SERPL-MCNC: 9 MG/DL (ref 6–20)
BUN/CREAT SERPL: 10 (ref 12–20)
BUN/CREAT SERPL: 8 (ref 12–20)
CA-I BLD-MCNC: 8.2 MG/DL (ref 8.5–10.1)
CA-I BLD-MCNC: 8.6 MG/DL (ref 8.5–10.1)
CHLORIDE SERPL-SCNC: 109 MMOL/L (ref 97–108)
CHLORIDE SERPL-SCNC: 112 MMOL/L (ref 97–108)
CO2 SERPL-SCNC: 20 MMOL/L (ref 21–32)
CO2 SERPL-SCNC: 21 MMOL/L (ref 21–32)
CREAT FLD-MCNC: 6.9 MG/DL
CREAT SERPL-MCNC: 1.08 MG/DL (ref 0.55–1.02)
CREAT SERPL-MCNC: 1.16 MG/DL (ref 0.55–1.02)
ERYTHROCYTE [DISTWIDTH] IN BLOOD BY AUTOMATED COUNT: 14 % (ref 11.5–14.5)
GLOBULIN SER CALC-MCNC: 3.5 G/DL (ref 2–4)
GLUCOSE BLD STRIP.AUTO-MCNC: 128 MG/DL (ref 65–100)
GLUCOSE SERPL-MCNC: 160 MG/DL (ref 65–100)
GLUCOSE SERPL-MCNC: 228 MG/DL (ref 65–100)
HCT VFR BLD AUTO: 34.1 % (ref 35–47)
HGB BLD-MCNC: 11.1 G/DL (ref 11.5–16)
Lab: NORMAL
MAGNESIUM SERPL-MCNC: 1.8 MG/DL (ref 1.6–2.4)
MCH RBC QN AUTO: 30.2 PG (ref 26–34)
MCHC RBC AUTO-ENTMCNC: 32.6 G/DL (ref 30–36.5)
MCV RBC AUTO: 92.7 FL (ref 80–99)
NRBC # BLD: 0 K/UL (ref 0–0.01)
NRBC BLD-RTO: 0 PER 100 WBC
PERFORMED BY:: ABNORMAL
PHOSPHATE SERPL-MCNC: 1.8 MG/DL (ref 2.6–4.7)
PLATELET # BLD AUTO: 252 K/UL (ref 150–400)
PMV BLD AUTO: 11.3 FL (ref 8.9–12.9)
POTASSIUM SERPL-SCNC: 4.1 MMOL/L (ref 3.5–5.1)
POTASSIUM SERPL-SCNC: 4.3 MMOL/L (ref 3.5–5.1)
PROT SERPL-MCNC: 6.5 G/DL (ref 6.4–8.2)
RBC # BLD AUTO: 3.68 M/UL (ref 3.8–5.2)
SODIUM SERPL-SCNC: 136 MMOL/L (ref 136–145)
SODIUM SERPL-SCNC: 138 MMOL/L (ref 136–145)
WBC # BLD AUTO: 12.4 K/UL (ref 3.6–11)

## 2024-05-07 PROCEDURE — 6370000000 HC RX 637 (ALT 250 FOR IP): Performed by: NURSE PRACTITIONER

## 2024-05-07 PROCEDURE — 2500000003 HC RX 250 WO HCPCS: Performed by: NURSE PRACTITIONER

## 2024-05-07 PROCEDURE — 2580000003 HC RX 258: Performed by: NURSE PRACTITIONER

## 2024-05-07 PROCEDURE — 6360000002 HC RX W HCPCS: Performed by: NURSE PRACTITIONER

## 2024-05-07 PROCEDURE — 80053 COMPREHEN METABOLIC PANEL: CPT

## 2024-05-07 PROCEDURE — 82570 ASSAY OF URINE CREATININE: CPT

## 2024-05-07 PROCEDURE — 94761 N-INVAS EAR/PLS OXIMETRY MLT: CPT

## 2024-05-07 PROCEDURE — 1100000000 HC RM PRIVATE

## 2024-05-07 PROCEDURE — 80048 BASIC METABOLIC PNL TOTAL CA: CPT

## 2024-05-07 PROCEDURE — 82962 GLUCOSE BLOOD TEST: CPT

## 2024-05-07 PROCEDURE — 84100 ASSAY OF PHOSPHORUS: CPT

## 2024-05-07 PROCEDURE — 83735 ASSAY OF MAGNESIUM: CPT

## 2024-05-07 PROCEDURE — 36415 COLL VENOUS BLD VENIPUNCTURE: CPT

## 2024-05-07 PROCEDURE — 85027 COMPLETE CBC AUTOMATED: CPT

## 2024-05-07 PROCEDURE — 6360000002 HC RX W HCPCS: Performed by: UROLOGY

## 2024-05-07 RX ORDER — SIMETHICONE 80 MG
80 TABLET,CHEWABLE ORAL 4 TIMES DAILY
Status: DISCONTINUED | OUTPATIENT
Start: 2024-05-07 | End: 2024-05-11 | Stop reason: HOSPADM

## 2024-05-07 RX ORDER — DIPHENHYDRAMINE HYDROCHLORIDE 50 MG/ML
25 INJECTION INTRAMUSCULAR; INTRAVENOUS EVERY 6 HOURS PRN
Status: DISCONTINUED | OUTPATIENT
Start: 2024-05-07 | End: 2024-05-11 | Stop reason: HOSPADM

## 2024-05-07 RX ORDER — HYDROMORPHONE HYDROCHLORIDE 1 MG/ML
0.5 INJECTION, SOLUTION INTRAMUSCULAR; INTRAVENOUS; SUBCUTANEOUS
Status: DISPENSED | OUTPATIENT
Start: 2024-05-07 | End: 2024-05-09

## 2024-05-07 RX ADMIN — HYDROMORPHONE HYDROCHLORIDE 0.5 MG: 1 INJECTION, SOLUTION INTRAMUSCULAR; INTRAVENOUS; SUBCUTANEOUS at 12:51

## 2024-05-07 RX ADMIN — LEVOFLOXACIN 500 MG: 5 INJECTION, SOLUTION INTRAVENOUS at 08:43

## 2024-05-07 RX ADMIN — POTASSIUM CHLORIDE, DEXTROSE MONOHYDRATE AND SODIUM CHLORIDE: 150; 5; 450 INJECTION, SOLUTION INTRAVENOUS at 22:07

## 2024-05-07 RX ADMIN — POTASSIUM CHLORIDE, DEXTROSE MONOHYDRATE AND SODIUM CHLORIDE: 150; 5; 450 INJECTION, SOLUTION INTRAVENOUS at 06:44

## 2024-05-07 RX ADMIN — FAMOTIDINE 20 MG: 10 INJECTION, SOLUTION INTRAVENOUS at 21:13

## 2024-05-07 RX ADMIN — SIMETHICONE 80 MG: 80 TABLET, CHEWABLE ORAL at 20:43

## 2024-05-07 RX ADMIN — FAMOTIDINE 20 MG: 10 INJECTION, SOLUTION INTRAVENOUS at 08:32

## 2024-05-07 RX ADMIN — HYDROMORPHONE HYDROCHLORIDE 0.5 MG: 1 INJECTION, SOLUTION INTRAMUSCULAR; INTRAVENOUS; SUBCUTANEOUS at 08:00

## 2024-05-07 RX ADMIN — METRONIDAZOLE 500 MG: 500 INJECTION, SOLUTION INTRAVENOUS at 01:46

## 2024-05-07 RX ADMIN — METRONIDAZOLE 500 MG: 500 INJECTION, SOLUTION INTRAVENOUS at 23:37

## 2024-05-07 RX ADMIN — HYDROMORPHONE HYDROCHLORIDE 0.5 MG: 1 INJECTION, SOLUTION INTRAMUSCULAR; INTRAVENOUS; SUBCUTANEOUS at 05:34

## 2024-05-07 RX ADMIN — METRONIDAZOLE 500 MG: 500 INJECTION, SOLUTION INTRAVENOUS at 08:33

## 2024-05-07 RX ADMIN — SIMETHICONE 80 MG: 80 TABLET, CHEWABLE ORAL at 10:11

## 2024-05-07 RX ADMIN — HYDROMORPHONE HYDROCHLORIDE 0.5 MG: 1 INJECTION, SOLUTION INTRAMUSCULAR; INTRAVENOUS; SUBCUTANEOUS at 20:42

## 2024-05-07 RX ADMIN — POTASSIUM CHLORIDE, DEXTROSE MONOHYDRATE AND SODIUM CHLORIDE: 150; 5; 450 INJECTION, SOLUTION INTRAVENOUS at 14:47

## 2024-05-07 RX ADMIN — ONDANSETRON 4 MG: 2 INJECTION INTRAMUSCULAR; INTRAVENOUS at 15:51

## 2024-05-07 RX ADMIN — HYDROCORTISONE SODIUM SUCCINATE 100 MG: 100 INJECTION, POWDER, FOR SOLUTION INTRAMUSCULAR; INTRAVENOUS at 08:32

## 2024-05-07 RX ADMIN — SODIUM PHOSPHATE, MONOBASIC, MONOHYDRATE AND SODIUM PHOSPHATE, DIBASIC, ANHYDROUS 20 MMOL: 142; 276 INJECTION, SOLUTION INTRAVENOUS at 13:16

## 2024-05-07 RX ADMIN — SIMETHICONE 80 MG: 80 TABLET, CHEWABLE ORAL at 14:45

## 2024-05-07 RX ADMIN — METRONIDAZOLE 500 MG: 500 INJECTION, SOLUTION INTRAVENOUS at 15:52

## 2024-05-07 RX ADMIN — HYDROMORPHONE HYDROCHLORIDE 0.5 MG: 1 INJECTION, SOLUTION INTRAMUSCULAR; INTRAVENOUS; SUBCUTANEOUS at 01:46

## 2024-05-07 RX ADMIN — HYDROMORPHONE HYDROCHLORIDE 0.5 MG: 1 INJECTION, SOLUTION INTRAMUSCULAR; INTRAVENOUS; SUBCUTANEOUS at 15:51

## 2024-05-07 RX ADMIN — SIMETHICONE 80 MG: 80 TABLET, CHEWABLE ORAL at 17:51

## 2024-05-07 ASSESSMENT — PAIN DESCRIPTION - DESCRIPTORS
DESCRIPTORS: DULL
DESCRIPTORS: DULL;TENDER
DESCRIPTORS: DULL
DESCRIPTORS: DULL

## 2024-05-07 ASSESSMENT — PAIN DESCRIPTION - ORIENTATION
ORIENTATION: RIGHT;LEFT
ORIENTATION: MID
ORIENTATION: MID
ORIENTATION: MID;POSTERIOR;LOWER

## 2024-05-07 ASSESSMENT — PAIN DESCRIPTION - LOCATION
LOCATION: ABDOMEN
LOCATION: ABDOMEN
LOCATION: INCISION

## 2024-05-07 ASSESSMENT — PAIN - FUNCTIONAL ASSESSMENT
PAIN_FUNCTIONAL_ASSESSMENT: ACTIVITIES ARE NOT PREVENTED
PAIN_FUNCTIONAL_ASSESSMENT: PREVENTS OR INTERFERES SOME ACTIVE ACTIVITIES AND ADLS

## 2024-05-07 ASSESSMENT — PAIN SCALES - GENERAL
PAINLEVEL_OUTOF10: 8
PAINLEVEL_OUTOF10: 7
PAINLEVEL_OUTOF10: 4
PAINLEVEL_OUTOF10: 5
PAINLEVEL_OUTOF10: 8
PAINLEVEL_OUTOF10: 0

## 2024-05-07 ASSESSMENT — ENCOUNTER SYMPTOMS: ABDOMINAL PAIN: 1

## 2024-05-07 ASSESSMENT — PAIN DESCRIPTION - PAIN TYPE: TYPE: SURGICAL PAIN

## 2024-05-07 ASSESSMENT — PAIN DESCRIPTION - FREQUENCY: FREQUENCY: CONTINUOUS

## 2024-05-07 NOTE — WOUND CARE
WCN met with patient.  She is post-op day one.  Serosanguineous colored urine noted in pouch.  Unable to fully visualize stoma.  Patient complains of mild abdominal pain.  Patient confirmed she read through education booklet and watched education videos provided last week.  Patient was advised to feel her pouch frequently and inform RN when needs to be emptied.  Will begin ostomy education review tomorrow.

## 2024-05-07 NOTE — CARE COORDINATION
05/07/24 1004   Service Assessment   Patient Orientation Alert and Oriented   Cognition Alert   History Provided By Patient   Primary Caregiver Self   Support Systems Children;Family Members;Friends/Neighbors   Patient's Healthcare Decision Maker is: Legal Next of Kin   PCP Verified by CM Yes  ([facility])   Last Visit to PCP Within last 3 months   Prior Functional Level Independent in ADLs/IADLs   Current Functional Level Independent in ADLs/IADLs   Can patient return to prior living arrangement Yes   Ability to make needs known: Good   Family able to assist with home care needs: Yes   Would you like for me to discuss the discharge plan with any other family members/significant others, and if so, who? No   Financial Resources None   Community Resources None   Social/Functional History   Lives With Son;Family   Services At/After Discharge   Mode of Transport at Discharge Other (see comment)   Confirm Follow Up Transport Family     CM met f/f with Pt, she confirmed that the information on the face sheet is correct. Pt stated that she lives with her son, BRYANNA, and three Granddaughters.    Received an order for HH, Pt asked CM to check with her later about choice because her DIL works here in  and she can tell her who to use.    No HH, no DME and independent with ADL.    [facility] on [address] in [place]    Family will transport when D/C    CM dispo: home with HH    Advance Care Planning     General Advance Care Planning (ACP) Conversation    Date of Conversation: 5/7/2024      Healthcare Decision Maker:   Primary Decision Maker: HanhSerjio - Child - [phone]    Primary Decision Maker: HanhEloise - Daughter-in-Law - [phone]  Click here to complete Healthcare Decision Makers including selection of the Healthcare Decision Maker Relationship (ie \"Primary\").       Content/Action Overview:    Reviewed DNR/DNI and patient elects Full Code (Attempt Resuscitation)

## 2024-05-07 NOTE — CARE COORDINATION
Patient arrived to [address] [place] [address],  CM met with patient at bedside to follow up on home health recommendations.  Patient stated her daughter just left and she would know who to use, she states her daughter will be here tomorrow and CM can follow up tomorrow about home health choices.

## 2024-05-07 NOTE — PROGRESS NOTES
UROLOGY Progress Note         [phone]      Daily Progress Note: 5/7/2024    Subjective:   The patient is seen for UROLOGIC follow up after cystectomy and ileal conduit diversion on 5/6/24.        She is POD 1 today.      Conduit output: 1625 cc  Drain output: 100 cc  No NGT.    BP stable, slightly hypertensive at times to the 170's systolic.Afebrile.  WBC 12.4 post op. On Metronidazole and Levaquin.  5/1/24 culture with aerococcus urinae A; on keflex pre-op.  5/6/24 culture pending.  Cr 1.16 from 1.10  Glucose was 228 this am; receiving steroid dose.  Phos low at 1.8; will replace.    Problem List:  Patient Active Problem List   Diagnosis    Radiation cystitis    Malignant neoplasm of overlapping sites of bladder (HCC)    Malignant neoplasm of overlapping sites of cervix (HCC)    Bladder cancer (HCC)       Medications reviewed  Current Facility-Administered Medications   Medication Dose Route Frequency    HYDROmorphone HCl PF (DILAUDID) injection 0.5 mg  0.5 mg IntraVENous Q2H PRN    dextrose 5 % and 0.45 % NaCl with KCl 20 mEq infusion   IntraVENous Continuous    acetaminophen (TYLENOL) tablet 650 mg  650 mg Oral Q4H PRN    ondansetron (ZOFRAN) injection 4 mg  4 mg IntraVENous Q6H PRN    prochlorperazine (COMPAZINE) injection 10 mg  10 mg IntraVENous Q6H PRN    hydrALAZINE (APRESOLINE) injection 5 mg  5 mg IntraVENous Q6H PRN    lidocaine 4 % external patch 1 patch  1 patch TransDERmal Daily    famotidine (PEPCID) injection 20 mg  20 mg IntraVENous BID    levoFLOXacin (LEVAQUIN) 500 MG/100ML infusion 500 mg  500 mg IntraVENous Q24H    metroNIDAZOLE (FLAGYL) 500 mg in 0.9% NaCl 100 mL IVPB premix  500 mg IntraVENous Q8H    hydrocortisone sodium succinate PF (SOLU-CORTEF) injection 100 mg  100 mg IntraVENous Daily       Review of Systems:   Review of Systems   Gastrointestinal:  Positive for abdominal pain.         Objective:   Physical Exam  Constitutional:       General: She is not in acute distress.

## 2024-05-08 LAB
ANION GAP SERPL CALC-SCNC: 5 MMOL/L (ref 5–15)
BUN SERPL-MCNC: 7 MG/DL (ref 6–20)
BUN/CREAT SERPL: 7 (ref 12–20)
CA-I BLD-MCNC: 8.6 MG/DL (ref 8.5–10.1)
CHLORIDE SERPL-SCNC: 112 MMOL/L (ref 97–108)
CO2 SERPL-SCNC: 25 MMOL/L (ref 21–32)
CREAT SERPL-MCNC: 1 MG/DL (ref 0.55–1.02)
ERYTHROCYTE [DISTWIDTH] IN BLOOD BY AUTOMATED COUNT: 14 % (ref 11.5–14.5)
GLUCOSE BLD STRIP.AUTO-MCNC: 123 MG/DL (ref 65–100)
GLUCOSE BLD STRIP.AUTO-MCNC: 127 MG/DL (ref 65–100)
GLUCOSE BLD STRIP.AUTO-MCNC: 134 MG/DL (ref 65–100)
GLUCOSE SERPL-MCNC: 138 MG/DL (ref 65–100)
HCT VFR BLD AUTO: 34.3 % (ref 35–47)
HGB BLD-MCNC: 11 G/DL (ref 11.5–16)
MAGNESIUM SERPL-MCNC: 1.7 MG/DL (ref 1.6–2.4)
MCH RBC QN AUTO: 29.7 PG (ref 26–34)
MCHC RBC AUTO-ENTMCNC: 32.1 G/DL (ref 30–36.5)
MCV RBC AUTO: 92.7 FL (ref 80–99)
NRBC # BLD: 0 K/UL (ref 0–0.01)
NRBC BLD-RTO: 0 PER 100 WBC
PERFORMED BY:: ABNORMAL
PHOSPHATE SERPL-MCNC: 1.5 MG/DL (ref 2.6–4.7)
PLATELET # BLD AUTO: 260 K/UL (ref 150–400)
PMV BLD AUTO: 11.3 FL (ref 8.9–12.9)
POTASSIUM SERPL-SCNC: 3.8 MMOL/L (ref 3.5–5.1)
RBC # BLD AUTO: 3.7 M/UL (ref 3.8–5.2)
SODIUM SERPL-SCNC: 142 MMOL/L (ref 136–145)
WBC # BLD AUTO: 12 K/UL (ref 3.6–11)

## 2024-05-08 PROCEDURE — 2580000003 HC RX 258: Performed by: NURSE PRACTITIONER

## 2024-05-08 PROCEDURE — 36415 COLL VENOUS BLD VENIPUNCTURE: CPT

## 2024-05-08 PROCEDURE — 6360000002 HC RX W HCPCS: Performed by: NURSE PRACTITIONER

## 2024-05-08 PROCEDURE — 94761 N-INVAS EAR/PLS OXIMETRY MLT: CPT

## 2024-05-08 PROCEDURE — 2500000003 HC RX 250 WO HCPCS: Performed by: NURSE PRACTITIONER

## 2024-05-08 PROCEDURE — 99024 POSTOP FOLLOW-UP VISIT: CPT | Performed by: NURSE PRACTITIONER

## 2024-05-08 PROCEDURE — 6370000000 HC RX 637 (ALT 250 FOR IP): Performed by: NURSE PRACTITIONER

## 2024-05-08 PROCEDURE — 82962 GLUCOSE BLOOD TEST: CPT

## 2024-05-08 PROCEDURE — 6370000000 HC RX 637 (ALT 250 FOR IP): Performed by: HOSPITALIST

## 2024-05-08 PROCEDURE — 1100000000 HC RM PRIVATE

## 2024-05-08 PROCEDURE — 80048 BASIC METABOLIC PNL TOTAL CA: CPT

## 2024-05-08 PROCEDURE — 83735 ASSAY OF MAGNESIUM: CPT

## 2024-05-08 PROCEDURE — 97161 PT EVAL LOW COMPLEX 20 MIN: CPT

## 2024-05-08 PROCEDURE — 85027 COMPLETE CBC AUTOMATED: CPT

## 2024-05-08 PROCEDURE — 84100 ASSAY OF PHOSPHORUS: CPT

## 2024-05-08 PROCEDURE — 6360000002 HC RX W HCPCS: Performed by: UROLOGY

## 2024-05-08 PROCEDURE — 97530 THERAPEUTIC ACTIVITIES: CPT

## 2024-05-08 RX ORDER — HYDROXYZINE PAMOATE 25 MG/1
25 CAPSULE ORAL 3 TIMES DAILY PRN
Status: DISCONTINUED | OUTPATIENT
Start: 2024-05-08 | End: 2024-05-11 | Stop reason: HOSPADM

## 2024-05-08 RX ADMIN — HYDROMORPHONE HYDROCHLORIDE 0.5 MG: 1 INJECTION, SOLUTION INTRAMUSCULAR; INTRAVENOUS; SUBCUTANEOUS at 21:07

## 2024-05-08 RX ADMIN — LEVOFLOXACIN 500 MG: 5 INJECTION, SOLUTION INTRAVENOUS at 08:56

## 2024-05-08 RX ADMIN — METRONIDAZOLE 500 MG: 500 INJECTION, SOLUTION INTRAVENOUS at 08:57

## 2024-05-08 RX ADMIN — FAMOTIDINE 20 MG: 10 INJECTION, SOLUTION INTRAVENOUS at 11:31

## 2024-05-08 RX ADMIN — HYDROCORTISONE SODIUM SUCCINATE 100 MG: 100 INJECTION, POWDER, FOR SOLUTION INTRAMUSCULAR; INTRAVENOUS at 08:57

## 2024-05-08 RX ADMIN — HYDROXYZINE PAMOATE 25 MG: 25 CAPSULE ORAL at 07:17

## 2024-05-08 RX ADMIN — SIMETHICONE 80 MG: 80 TABLET, CHEWABLE ORAL at 16:43

## 2024-05-08 RX ADMIN — METRONIDAZOLE 500 MG: 500 INJECTION, SOLUTION INTRAVENOUS at 16:43

## 2024-05-08 RX ADMIN — ONDANSETRON 4 MG: 2 INJECTION INTRAMUSCULAR; INTRAVENOUS at 10:23

## 2024-05-08 RX ADMIN — SIMETHICONE 80 MG: 80 TABLET, CHEWABLE ORAL at 21:14

## 2024-05-08 RX ADMIN — HYDROMORPHONE HYDROCHLORIDE 0.5 MG: 1 INJECTION, SOLUTION INTRAMUSCULAR; INTRAVENOUS; SUBCUTANEOUS at 03:23

## 2024-05-08 RX ADMIN — FAMOTIDINE 20 MG: 10 INJECTION, SOLUTION INTRAVENOUS at 21:10

## 2024-05-08 RX ADMIN — SODIUM PHOSPHATE, MONOBASIC, MONOHYDRATE AND SODIUM PHOSPHATE, DIBASIC, ANHYDROUS 20 MMOL: 142; 276 INJECTION, SOLUTION INTRAVENOUS at 10:25

## 2024-05-08 RX ADMIN — HYDROMORPHONE HYDROCHLORIDE 0.5 MG: 1 INJECTION, SOLUTION INTRAMUSCULAR; INTRAVENOUS; SUBCUTANEOUS at 08:59

## 2024-05-08 RX ADMIN — POTASSIUM CHLORIDE, DEXTROSE MONOHYDRATE AND SODIUM CHLORIDE: 150; 5; 450 INJECTION, SOLUTION INTRAVENOUS at 05:48

## 2024-05-08 RX ADMIN — POTASSIUM CHLORIDE, DEXTROSE MONOHYDRATE AND SODIUM CHLORIDE: 150; 5; 450 INJECTION, SOLUTION INTRAVENOUS at 18:04

## 2024-05-08 RX ADMIN — SIMETHICONE 80 MG: 80 TABLET, CHEWABLE ORAL at 14:25

## 2024-05-08 RX ADMIN — SIMETHICONE 80 MG: 80 TABLET, CHEWABLE ORAL at 08:56

## 2024-05-08 RX ADMIN — HYDROMORPHONE HYDROCHLORIDE 0.5 MG: 1 INJECTION, SOLUTION INTRAMUSCULAR; INTRAVENOUS; SUBCUTANEOUS at 14:53

## 2024-05-08 ASSESSMENT — PAIN - FUNCTIONAL ASSESSMENT
PAIN_FUNCTIONAL_ASSESSMENT: PREVENTS OR INTERFERES SOME ACTIVE ACTIVITIES AND ADLS
PAIN_FUNCTIONAL_ASSESSMENT: ACTIVITIES ARE NOT PREVENTED
PAIN_FUNCTIONAL_ASSESSMENT: ACTIVITIES ARE NOT PREVENTED

## 2024-05-08 ASSESSMENT — PAIN DESCRIPTION - ORIENTATION
ORIENTATION: MID
ORIENTATION: ANTERIOR
ORIENTATION: MID
ORIENTATION: RIGHT

## 2024-05-08 ASSESSMENT — PAIN SCALES - GENERAL
PAINLEVEL_OUTOF10: 4
PAINLEVEL_OUTOF10: 0
PAINLEVEL_OUTOF10: 7
PAINLEVEL_OUTOF10: 0
PAINLEVEL_OUTOF10: 4
PAINLEVEL_OUTOF10: 5
PAINLEVEL_OUTOF10: 6
PAINLEVEL_OUTOF10: 3

## 2024-05-08 ASSESSMENT — PAIN DESCRIPTION - LOCATION
LOCATION: ABDOMEN

## 2024-05-08 ASSESSMENT — PAIN DESCRIPTION - DESCRIPTORS
DESCRIPTORS: SHARP
DESCRIPTORS: ACHING;SHARP
DESCRIPTORS: SHARP;ACHING;SORE
DESCRIPTORS: ACHING

## 2024-05-08 ASSESSMENT — PAIN DESCRIPTION - PAIN TYPE: TYPE: ACUTE PAIN

## 2024-05-08 ASSESSMENT — ENCOUNTER SYMPTOMS: ABDOMINAL PAIN: 1

## 2024-05-08 NOTE — PROGRESS NOTES
0230 Patient called and stated that she was leaking.  When this Nurse went into assess patient, Urostomy bag noted to be full and leaking a small amount of urine.  Urostomy bag emptied and site reinforced with tape.  Patient's gown, linen and pad changed.  New appliance could not found on unit.    0245 ICU called to see if they had the appliance needed for patient's urostomy.  ICU Nurse stated that she would tube appliance to 4E.    0315 The appliance that the ICU Nurse sent is not the same as the appliance that the surgeon applied.   Patient's appliance still leaking.  Drain sponges placed around appliance.    0330  On call for Dr. Joseph called.  Voice message stated that there was no on call available.    0335 Nursing supervisor called and informed that patient's appliance was leaking and that the appliances given by ICU and found in supply were not the same as what the surgeon applied.  Nursing supervisor informed that this Nurse did not feel comfortable taking appliance off since it was new and the appliance that the surgeon placed was not available.  Nursing Supervisor stated that she would send a 5E Nurse over to take a look at it.    0350 Two Nurses from 5E Kamala,RN and GRICEL Regan in to see if appliance they have will work for patient.  Patient's current appliance taken off and new appliance applied and daniel bag attached to end of appliance for output.  Patient tolerated well.  Will continue plan of care.    0534 Dr. Rickie yap served d/t patient having anxiety and would like med to calm her nerves.

## 2024-05-08 NOTE — PLAN OF CARE
PHYSICAL THERAPY EVALUATION  Patient: Daisy Michaud (63 y.o. female)  Date: 5/8/2024  Primary Diagnosis: Malignant neoplasm of overlapping sites of bladder (HCC) [C67.8]  Bladder cancer (HCC) [C67.9]  Procedure(s) (LRB):  ROBOTIC ASSISTED LAPAROSCOPIC CYSTECTOMY WITH URETEROILEAL CONDUIT INCLUDING INTESTINE ANASTOMOSIS AND LYSIS OF ADHESIONS (N/A) 2 Days Post-Op   Precautions: NPO, General Precautions                      Recommendations for nursing mobility: Out of bed to chair for meals, Encourage HEP in prep for ADLs/mobility; see handout for details, Amb in hallway, and Assist x1    In place during session: Peripheral IV, EKG/telemetry , and closed/suction drain RLQ    ASSESSMENT  Pt is a 63 y.o. female admitted on 5/6/2024 malignant neoplasm of overlapping sites of bladder; PMHx cervical cancer s/p hysterectomy 2006 and radiation therapy, bladder cx; pt currently being treated for s/p ROBOTIC ASSISTED LAPAROSCOPIC CYSTECTOMY WITH URETEROILEAL CONDUIT INCLUDING INTESTINE ANASTOMOSIS AND LYSIS OF ADHESIONS. Pt semi supine upon PT arrival, agreeable to evaluation. Pt A&O x 4.      Based on the objective data described below, the patient currently presents with impaired functional mobility, decreased independence in ADLs, impaired strength, decreased activity tolerance, and increased pain levels. (See below for objective details and assist levels).     Overall pt tolerated session good today with c/o 1-2/10 pain. Pt completed bed mobility using log roll technique to L with modified independence. Pt required SBA for all transfers for safety, denies dizziness with change in position, no LOB or unsteadiness noted.  Pt amb 150 feet without use of AD, gt belt and SBA; demonstrates narrow PRESTON, slow brenda and shortened step length bilaterally, good dynamic standing balance. Pt most limited by activity tolerance and would benefit from continued skilled therapy to maintain functional strength, improve activity tolerance

## 2024-05-08 NOTE — WOUND CARE
Met with patient for ostomy education review and assess for pouching needs.  Patient currently wearing a high-flow ostomy pouch.  Pouch is well-fitting so left intact.  Discussed with patient the events from last night and listened to concerns.  Patent wishes to be rounded on hourly at night and I informed Melinda Henao.  Patient was provided container and gloves and patient emptied approximately 100 mL of urine from pouch and secured spout.  Patient advised to begin emptying pouch independently in measuring container.  Rn to document output.  Left 2 x 2-piece convex click urostomy pouches in patient's room in event pouch has to be changed again.  Reviewed the following:  -Empty / burp pouch when 1/3-1/2 full of stool or gas.  -Change appliance (wafer and pouch) 2-3 per week.  If leaking, change as soon as possible to prevent skin irritation.  -Best time for routine change of appliance is first thing in the morning before consuming food or liquids (depending on which type of ostomy).  -Clean stoma and peristomal skin with plain water or NS, may use a mild soap.  No soaps with lotions as they may prevent adhesive from adhering to skin.  May bathe with appliance on or off.  -Purposes and how to use barrier rings.  -Do not decrease intake to decrease output as this will lead to dehydration.    Patient complained of feeling light-headed.  Patient positioned back in recliner with feet elevated and informed Melinda Henao.  WCN will continue to follow for education review and pouching needs. Plan for pouch change with WCN on Friday 5/10/24.

## 2024-05-08 NOTE — PROGRESS NOTES
Report called to 5E nurse. Report consisted of SBAR, vitals, labs, meds, plan and outstading items to complete. An opportunity was given to ask questions.

## 2024-05-08 NOTE — CARE COORDINATION
CM followed up with daughter in-law Eloise Schafer and gave her a HH choice list.  CM followed up with patient and she is agreeable to  company her DIL chooses. Family has not made a decision as of this time.

## 2024-05-08 NOTE — PROGRESS NOTES
Pt arrive to floor assessment completed pt requested and given pain med with Zofran for nausea  call bell with in reach     1751  Pt given med without difficulty     1830  Pt's drain flushed with normal saline multiple clots noted.     1922  Report given to on coming nurse Sumi opportunity given to ask questions

## 2024-05-08 NOTE — PLAN OF CARE
Problem: Pain  Goal: Verbalizes/displays adequate comfort level or baseline comfort level  Outcome: Progressing     Problem: Safety - Adult  Goal: Free from fall injury  Outcome: Progressing     Problem: Discharge Planning  Goal: Discharge to home or other facility with appropriate resources  Outcome: Progressing     Problem: Coping  Goal: Pt/Family able to verbalize concerns and demonstrate effective coping strategies  Description: INTERVENTIONS:  1. Assist patient/family to identify coping skills, available support systems and cultural and spiritual values  2. Provide emotional support, including active listening and acknowledgement of concerns of patient and caregivers  3. Reduce environmental stimuli, as able  4. Instruct patient/family in relaxation techniques, as appropriate  5. Assess for spiritual pain/suffering and initiate Spiritual Care, Psychosocial Clinical Specialist consults as needed  Outcome: Progressing     Problem: Neurosensory - Adult  Goal: Achieves stable or improved neurological status  Outcome: Progressing  Goal: Achieves maximal functionality and self care  Outcome: Progressing     Problem: Respiratory - Adult  Goal: Achieves optimal ventilation and oxygenation  Outcome: Progressing     Problem: Cardiovascular - Adult  Goal: Maintains optimal cardiac output and hemodynamic stability  Outcome: Progressing  Goal: Absence of cardiac dysrhythmias or at baseline  Outcome: Progressing     Problem: Skin/Tissue Integrity - Adult  Goal: Incisions, wounds, or drain sites healing without S/S of infection  Outcome: Progressing     Problem: Gastrointestinal - Adult  Goal: Minimal or absence of nausea and vomiting  Outcome: Progressing  Goal: Maintains or returns to baseline bowel function  Outcome: Progressing  Goal: Maintains adequate nutritional intake  Outcome: Progressing     Problem: Genitourinary - Adult  Goal: Urinary catheter remains patent  Outcome: Progressing     Problem: ABCDS Injury

## 2024-05-08 NOTE — PROGRESS NOTES
4 Eyes Skin Assessment     NAME:  Daisy Michaud  YOB: 1960  MEDICAL RECORD NUMBER:  [id number]    The patient is being assessed for  Transfer to New Unit    I agree that at least one RN has performed a thorough Head to Toe Skin Assessment on the patient. ALL assessment sites listed below have been assessed.      Areas assessed by both nurses:    Head, Face, Ears, Shoulders, Back, Chest, Arms, Elbows, Hands, Sacrum. Buttock, Coccyx, Ischium, and Legs. Feet and Heels        Does the Patient have a Wound? Yes wound(s) were present on assessment. LDA wound assessment was Initiated and completed by RN; Sugical Incision        Esdras Prevention initiated by RN: Yes  Wound Care Orders initiated by RN: No    Pressure Injury (Stage 3,4, Unstageable, DTI, NWPT, and Complex wounds) if present, place Wound referral order by RN under : No    New Ostomies, if present place, Ostomy referral order under : Yes     Nurse 1 eSignature: Electronically signed by Casey Lares RN on 5/8/24 at 7:11 PM EDT    **SHARE this note so that the co-signing nurse can place an eSignature**    Nurse 2 eSignature: Electronically signed by Betty Peña RN on 5/8/24 at 8:30 PM EDT

## 2024-05-08 NOTE — PROGRESS NOTES
UROLOGY Progress Note         [phone]      Daily Progress Note: 5/8/2024    Subjective:   The patient is seen for UROLOGIC follow up after cystectomy and ileal conduit diversion on 5/6/24.    She is POD 2 today.    Conduit output: 3625 cc  Drain output: 250 cc    On Metronidazole and Levaquin.  5/1/24 culture with aerococcus urinae A; on keflex pre-op.  5/6/24 culture with no growth.    Had some problems overnight with ongoing leakage and inability to find a bag that fit. Discussed with patient and Emerita Casey in detail this morning.  Plan to leave extra bag at patient's bedside.  This caused some anxiety, but she is doing well this morning.    PT at bedside. She is ready to walk.    Problem List:  Patient Active Problem List   Diagnosis    Radiation cystitis    Malignant neoplasm of overlapping sites of bladder (HCC)    Malignant neoplasm of overlapping sites of cervix (HCC)    Bladder cancer (HCC)       Medications reviewed  Current Facility-Administered Medications   Medication Dose Route Frequency    hydrOXYzine pamoate (VISTARIL) capsule 25 mg  25 mg Oral TID PRN    HYDROmorphone HCl PF (DILAUDID) injection 0.5 mg  0.5 mg IntraVENous Q2H PRN    diphenhydrAMINE (BENADRYL) injection 25 mg  25 mg IntraVENous Q6H PRN    simethicone (MYLICON) chewable tablet 80 mg  80 mg Oral 4x Daily    dextrose 5 % and 0.45 % NaCl with KCl 20 mEq infusion   IntraVENous Continuous    acetaminophen (TYLENOL) tablet 650 mg  650 mg Oral Q4H PRN    ondansetron (ZOFRAN) injection 4 mg  4 mg IntraVENous Q6H PRN    prochlorperazine (COMPAZINE) injection 10 mg  10 mg IntraVENous Q6H PRN    hydrALAZINE (APRESOLINE) injection 5 mg  5 mg IntraVENous Q6H PRN    lidocaine 4 % external patch 1 patch  1 patch TransDERmal Daily    famotidine (PEPCID) injection 20 mg  20 mg IntraVENous BID    levoFLOXacin (LEVAQUIN) 500 MG/100ML infusion 500 mg  500 mg IntraVENous Q24H    metroNIDAZOLE (FLAGYL) 500 mg in 0.9% NaCl 100 mL IVPB premix

## 2024-05-09 ENCOUNTER — APPOINTMENT (OUTPATIENT)
Facility: HOSPITAL | Age: 64
End: 2024-05-09
Attending: UROLOGY
Payer: MEDICARE

## 2024-05-09 ENCOUNTER — TELEPHONE (OUTPATIENT)
Age: 64
End: 2024-05-09

## 2024-05-09 DIAGNOSIS — C67.9 MALIGNANT NEOPLASM OF URINARY BLADDER, UNSPECIFIED SITE (HCC): Primary | ICD-10-CM

## 2024-05-09 LAB
ALBUMIN SERPL-MCNC: 2.8 G/DL (ref 3.5–5)
ALBUMIN/GLOB SERPL: 0.9 (ref 1.1–2.2)
ALP SERPL-CCNC: 62 U/L (ref 45–117)
ALT SERPL-CCNC: 30 U/L (ref 12–78)
ANION GAP SERPL CALC-SCNC: 6 MMOL/L (ref 5–15)
ANION GAP SERPL CALC-SCNC: 6 MMOL/L (ref 5–15)
AST SERPL W P-5'-P-CCNC: 13 U/L (ref 15–37)
BILIRUB SERPL-MCNC: 0.5 MG/DL (ref 0.2–1)
BUN SERPL-MCNC: 7 MG/DL (ref 6–20)
BUN SERPL-MCNC: 8 MG/DL (ref 6–20)
BUN/CREAT SERPL: 8 (ref 12–20)
BUN/CREAT SERPL: 9 (ref 12–20)
CA-I BLD-MCNC: 8.6 MG/DL (ref 8.5–10.1)
CA-I BLD-MCNC: 8.9 MG/DL (ref 8.5–10.1)
CHLORIDE SERPL-SCNC: 111 MMOL/L (ref 97–108)
CHLORIDE SERPL-SCNC: 111 MMOL/L (ref 97–108)
CO2 SERPL-SCNC: 21 MMOL/L (ref 21–32)
CO2 SERPL-SCNC: 22 MMOL/L (ref 21–32)
CREAT SERPL-MCNC: 0.92 MG/DL (ref 0.55–1.02)
CREAT SERPL-MCNC: 0.92 MG/DL (ref 0.55–1.02)
ERYTHROCYTE [DISTWIDTH] IN BLOOD BY AUTOMATED COUNT: 13.9 % (ref 11.5–14.5)
GLOBULIN SER CALC-MCNC: 3 G/DL (ref 2–4)
GLUCOSE SERPL-MCNC: 130 MG/DL (ref 65–100)
GLUCOSE SERPL-MCNC: 136 MG/DL (ref 65–100)
HCT VFR BLD AUTO: 37 % (ref 35–47)
HGB BLD-MCNC: 12.3 G/DL (ref 11.5–16)
MAGNESIUM SERPL-MCNC: 1.7 MG/DL (ref 1.6–2.4)
MCH RBC QN AUTO: 29.9 PG (ref 26–34)
MCHC RBC AUTO-ENTMCNC: 33.2 G/DL (ref 30–36.5)
MCV RBC AUTO: 90 FL (ref 80–99)
NRBC # BLD: 0 K/UL (ref 0–0.01)
NRBC BLD-RTO: 0 PER 100 WBC
PHOSPHATE SERPL-MCNC: 1.9 MG/DL (ref 2.6–4.7)
PLATELET # BLD AUTO: 273 K/UL (ref 150–400)
PMV BLD AUTO: 11.4 FL (ref 8.9–12.9)
POTASSIUM SERPL-SCNC: 3.9 MMOL/L (ref 3.5–5.1)
POTASSIUM SERPL-SCNC: 4.2 MMOL/L (ref 3.5–5.1)
PROT SERPL-MCNC: 5.8 G/DL (ref 6.4–8.2)
RBC # BLD AUTO: 4.11 M/UL (ref 3.8–5.2)
SODIUM SERPL-SCNC: 138 MMOL/L (ref 136–145)
SODIUM SERPL-SCNC: 139 MMOL/L (ref 136–145)
WBC # BLD AUTO: 11.7 K/UL (ref 3.6–11)

## 2024-05-09 PROCEDURE — 74018 RADEX ABDOMEN 1 VIEW: CPT

## 2024-05-09 PROCEDURE — 2500000003 HC RX 250 WO HCPCS: Performed by: NURSE PRACTITIONER

## 2024-05-09 PROCEDURE — 83735 ASSAY OF MAGNESIUM: CPT

## 2024-05-09 PROCEDURE — 84100 ASSAY OF PHOSPHORUS: CPT

## 2024-05-09 PROCEDURE — 1100000000 HC RM PRIVATE

## 2024-05-09 PROCEDURE — 99024 POSTOP FOLLOW-UP VISIT: CPT | Performed by: UROLOGY

## 2024-05-09 PROCEDURE — 6360000002 HC RX W HCPCS: Performed by: NURSE PRACTITIONER

## 2024-05-09 PROCEDURE — 80048 BASIC METABOLIC PNL TOTAL CA: CPT

## 2024-05-09 PROCEDURE — 80053 COMPREHEN METABOLIC PANEL: CPT

## 2024-05-09 PROCEDURE — 6370000000 HC RX 637 (ALT 250 FOR IP): Performed by: NURSE PRACTITIONER

## 2024-05-09 PROCEDURE — 2580000003 HC RX 258: Performed by: NURSE PRACTITIONER

## 2024-05-09 PROCEDURE — 85027 COMPLETE CBC AUTOMATED: CPT

## 2024-05-09 PROCEDURE — 36415 COLL VENOUS BLD VENIPUNCTURE: CPT

## 2024-05-09 PROCEDURE — 6370000000 HC RX 637 (ALT 250 FOR IP): Performed by: UROLOGY

## 2024-05-09 PROCEDURE — 99024 POSTOP FOLLOW-UP VISIT: CPT | Performed by: NURSE PRACTITIONER

## 2024-05-09 PROCEDURE — 6360000002 HC RX W HCPCS: Performed by: UROLOGY

## 2024-05-09 PROCEDURE — 6370000000 HC RX 637 (ALT 250 FOR IP): Performed by: HOSPITALIST

## 2024-05-09 RX ORDER — BISACODYL 10 MG
10 SUPPOSITORY, RECTAL RECTAL ONCE
Status: COMPLETED | OUTPATIENT
Start: 2024-05-09 | End: 2024-05-09

## 2024-05-09 RX ORDER — OXYCODONE HYDROCHLORIDE AND ACETAMINOPHEN 5; 325 MG/1; MG/1
1 TABLET ORAL EVERY 4 HOURS PRN
Status: DISCONTINUED | OUTPATIENT
Start: 2024-05-09 | End: 2024-05-10

## 2024-05-09 RX ORDER — SIMETHICONE 80 MG
80 TABLET,CHEWABLE ORAL EVERY 6 HOURS PRN
Status: DISCONTINUED | OUTPATIENT
Start: 2024-05-09 | End: 2024-05-10

## 2024-05-09 RX ADMIN — SIMETHICONE 80 MG: 80 TABLET, CHEWABLE ORAL at 02:41

## 2024-05-09 RX ADMIN — SIMETHICONE 80 MG: 80 TABLET, CHEWABLE ORAL at 21:19

## 2024-05-09 RX ADMIN — SIMETHICONE 80 MG: 80 TABLET, CHEWABLE ORAL at 13:15

## 2024-05-09 RX ADMIN — BISACODYL 10 MG: 10 SUPPOSITORY RECTAL at 21:19

## 2024-05-09 RX ADMIN — SIMETHICONE 80 MG: 80 TABLET, CHEWABLE ORAL at 17:25

## 2024-05-09 RX ADMIN — POTASSIUM CHLORIDE, DEXTROSE MONOHYDRATE AND SODIUM CHLORIDE: 150; 5; 450 INJECTION, SOLUTION INTRAVENOUS at 02:25

## 2024-05-09 RX ADMIN — FAMOTIDINE 20 MG: 10 INJECTION, SOLUTION INTRAVENOUS at 11:25

## 2024-05-09 RX ADMIN — METRONIDAZOLE 500 MG: 500 INJECTION, SOLUTION INTRAVENOUS at 11:24

## 2024-05-09 RX ADMIN — LEVOFLOXACIN 500 MG: 5 INJECTION, SOLUTION INTRAVENOUS at 11:24

## 2024-05-09 RX ADMIN — SIMETHICONE 80 MG: 80 TABLET, CHEWABLE ORAL at 11:24

## 2024-05-09 RX ADMIN — METRONIDAZOLE 500 MG: 500 INJECTION, SOLUTION INTRAVENOUS at 02:28

## 2024-05-09 RX ADMIN — HYDROCORTISONE SODIUM SUCCINATE 100 MG: 100 INJECTION, POWDER, FOR SOLUTION INTRAMUSCULAR; INTRAVENOUS at 11:25

## 2024-05-09 RX ADMIN — POTASSIUM CHLORIDE, DEXTROSE MONOHYDRATE AND SODIUM CHLORIDE: 150; 5; 450 INJECTION, SOLUTION INTRAVENOUS at 21:24

## 2024-05-09 RX ADMIN — FAMOTIDINE 20 MG: 10 INJECTION, SOLUTION INTRAVENOUS at 21:19

## 2024-05-09 RX ADMIN — ONDANSETRON 4 MG: 2 INJECTION INTRAMUSCULAR; INTRAVENOUS at 13:15

## 2024-05-09 RX ADMIN — SODIUM PHOSPHATE, MONOBASIC, MONOHYDRATE AND SODIUM PHOSPHATE, DIBASIC, ANHYDROUS 30 MMOL: 142; 276 INJECTION, SOLUTION INTRAVENOUS at 11:23

## 2024-05-09 ASSESSMENT — PAIN SCALES - GENERAL
PAINLEVEL_OUTOF10: 0
PAINLEVEL_OUTOF10: 10

## 2024-05-09 ASSESSMENT — ENCOUNTER SYMPTOMS: ABDOMINAL PAIN: 1

## 2024-05-09 ASSESSMENT — PAIN DESCRIPTION - DESCRIPTORS: DESCRIPTORS: CRAMPING

## 2024-05-09 ASSESSMENT — PAIN DESCRIPTION - LOCATION: LOCATION: ABDOMEN

## 2024-05-09 ASSESSMENT — PAIN DESCRIPTION - ORIENTATION: ORIENTATION: LOWER

## 2024-05-09 NOTE — TELEPHONE ENCOUNTER
Chacha-    I placed a referral for Ms. Michaud to see Sonia Mullen RN at the Ostomy clinic in [place].    She is inpatient now, and I have discussed this with her.  I'd like to try to get her scheduled later in the month around the 22-31.      Can you coordinate this, and I will add it to her discharge paperwork if you can let me know the date/time.    Thanks Toby arias

## 2024-05-09 NOTE — PROGRESS NOTES
UROLOGY Progress Note         [phone]      Daily Progress Note: 5/9/2024    Subjective:   The patient is seen for UROLOGIC follow up after cystectomy and ileal conduit diversion on 5/6/24.    She is POD 3 today.    Conduit output: 1825 cc  Drain output: 390 cc; bag is leaking.  Requested a replacement.    On Metronidazole and Levaquin.  5/1/24 culture with aerococcus urinae A; on keflex pre-op.  5/6/24 culture with no growth.    Doing well this morning; cc is gas pain. She denies incisional pain.  She has been ambulating, up to chair multiple times.  IS at goal.  She feels that it is \"stuck\" in the lower abdomen.  No flatus yet.  She had more nausea after she sat on the toilet  Problem List:  Patient Active Problem List   Diagnosis    Radiation cystitis    Malignant neoplasm of overlapping sites of bladder (HCC)    Malignant neoplasm of overlapping sites of cervix (HCC)    Bladder cancer (HCC)       Medications reviewed  Current Facility-Administered Medications   Medication Dose Route Frequency    simethicone (MYLICON) chewable tablet 80 mg  80 mg Oral Q6H PRN    sodium phosphate 30 mmol in sodium chloride 0.9 % 500 mL IVPB  30 mmol IntraVENous Once    hydrOXYzine pamoate (VISTARIL) capsule 25 mg  25 mg Oral TID PRN    diphenhydrAMINE (BENADRYL) injection 25 mg  25 mg IntraVENous Q6H PRN    simethicone (MYLICON) chewable tablet 80 mg  80 mg Oral 4x Daily    dextrose 5 % and 0.45 % NaCl with KCl 20 mEq infusion   IntraVENous Continuous    acetaminophen (TYLENOL) tablet 650 mg  650 mg Oral Q4H PRN    ondansetron (ZOFRAN) injection 4 mg  4 mg IntraVENous Q6H PRN    prochlorperazine (COMPAZINE) injection 10 mg  10 mg IntraVENous Q6H PRN    hydrALAZINE (APRESOLINE) injection 5 mg  5 mg IntraVENous Q6H PRN    lidocaine 4 % external patch 1 patch  1 patch TransDERmal Daily    famotidine (PEPCID) injection 20 mg  20 mg IntraVENous BID    levoFLOXacin (LEVAQUIN) 500 MG/100ML infusion 500 mg  500 mg IntraVENous    Bilirubin, Urine Negative (5/1/2024) Negative   Not in Time Range    Blood, Urine Moderate (A) (5/1/2024) Negative   Not in Time Range    Color, UA Yellow/Straw (5/1/2024)   Not in Time Range    Glucose, Ur Negative (5/1/2024) Negative mg/dL Not in Time Range    Ketones, Urine Negative (5/1/2024) Negative mg/dL Not in Time Range    Leukocyte Esterase, Urine Large (A) (5/1/2024) Negative   Not in Time Range    Nitrite, Urine Negative (5/1/2024) Negative   Not in Time Range    Protein,  (A) (5/1/2024) Negative mg/dL Not in Time Range    RBC, UA  (5/1/2024) 0 - 5 /hpf Not in Time Range    Urobilinogen, Urine 0.1 (5/1/2024) 0.1 - 1.0 EU/dL Not in Time Range    WBC, UA >100 (H) (5/1/2024) 0 - 4 /hpf Not in Time Range          Data Review:       Recent Days:  Recent Labs     05/07/24  0140 05/08/24  0450 05/09/24  0851   WBC 12.4* 12.0* 11.7*   HGB 11.1* 11.0* 12.3   HCT 34.1* 34.3* 37.0    260 273     Recent Labs     05/07/24  0140 05/07/24  1540 05/08/24  0450 05/09/24  0851 05/09/24  1401    138 142 139 138   K 4.3 4.1 3.8 3.9 4.2   * 112* 112* 111* 111*   CO2 21 20* 25 22 21   BUN 12 9 7 8 7   MG 1.8  --  1.7 1.7  --    PHOS 1.8*  --  1.5* 1.9*  --    ALT  --  26  --   --  30       24 Hour Results:  Recent Results (from the past 24 hour(s))   Basic Metabolic Panel    Collection Time: 05/09/24  8:51 AM   Result Value Ref Range    Sodium 139 136 - 145 mmol/L    Potassium 3.9 3.5 - 5.1 mmol/L    Chloride 111 (H) 97 - 108 mmol/L    CO2 22 21 - 32 mmol/L    Anion Gap 6 5 - 15 mmol/L    Glucose 136 (H) 65 - 100 mg/dL    BUN 8 6 - 20 mg/dL    Creatinine 0.92 0.55 - 1.02 mg/dL    Bun/Cre Ratio 9 (L) 12 - 20      Est, Glom Filt Rate 70 >60 ml/min/1.73m2    Calcium 8.9 8.5 - 10.1 mg/dL   CBC    Collection Time: 05/09/24  8:51 AM   Result Value Ref Range    WBC 11.7 (H) 3.6 - 11.0 K/uL    RBC 4.11 3.80 - 5.20 M/uL    Hemoglobin 12.3 11.5 - 16.0 g/dL    Hematocrit 37.0 35.0 - 47.0 %    MCV 90.0

## 2024-05-09 NOTE — PLAN OF CARE
Problem: Pain  Goal: Verbalizes/displays adequate comfort level or baseline comfort level  5/9/2024 0307 by Reyna Fletcher RN  Outcome: Progressing  5/8/2024 1312 by Melinda Flower RN  Outcome: Progressing     Problem: Safety - Adult  Goal: Free from fall injury  5/9/2024 0307 by Reyna Fletcher RN  Outcome: Progressing  5/8/2024 1312 by Melinda Flower RN  Outcome: Progressing     Problem: Discharge Planning  Goal: Discharge to home or other facility with appropriate resources  5/9/2024 0307 by Reyna Fletcher RN  Outcome: Progressing  5/8/2024 1312 by Melinda Flower RN  Outcome: Progressing     Problem: Coping  Goal: Pt/Family able to verbalize concerns and demonstrate effective coping strategies  Description: INTERVENTIONS:  1. Assist patient/family to identify coping skills, available support systems and cultural and spiritual values  2. Provide emotional support, including active listening and acknowledgement of concerns of patient and caregivers  3. Reduce environmental stimuli, as able  4. Instruct patient/family in relaxation techniques, as appropriate  5. Assess for spiritual pain/suffering and initiate Spiritual Care, Psychosocial Clinical Specialist consults as needed  5/9/2024 0307 by Reyna Fletcher RN  Outcome: Progressing  5/8/2024 1312 by Melinda Flower RN  Outcome: Progressing     Problem: Neurosensory - Adult  Goal: Achieves stable or improved neurological status  5/9/2024 0307 by Reyna Fletcher RN  Outcome: Progressing  5/8/2024 1312 by Melinda Flower RN  Outcome: Progressing  Goal: Achieves maximal functionality and self care  5/9/2024 0307 by Reyna Fletcher RN  Outcome: Progressing  5/8/2024 1312 by Melinda Flower RN  Outcome: Progressing     Problem: Respiratory - Adult  Goal: Achieves optimal ventilation and oxygenation  5/9/2024 0307 by Reyna Fletcher RN  Outcome: Progressing  5/8/2024 1312 by  Flower, Melinda, RN  Outcome: Progressing     Problem: Cardiovascular - Adult  Goal: Maintains optimal cardiac output and hemodynamic stability  5/9/2024 0307 by Reyna Fletcher RN  Outcome: Progressing  5/8/2024 1312 by Melinda Flower RN  Outcome: Progressing  Goal: Absence of cardiac dysrhythmias or at baseline  5/9/2024 0307 by Reyna Fletcher RN  Outcome: Progressing  5/8/2024 1312 by Melinda Flower RN  Outcome: Progressing     Problem: Skin/Tissue Integrity - Adult  Goal: Incisions, wounds, or drain sites healing without S/S of infection  5/9/2024 0307 by Reyna Fletcher RN  Outcome: Progressing  5/8/2024 1312 by Melinda Flower RN  Outcome: Progressing     Problem: Gastrointestinal - Adult  Goal: Minimal or absence of nausea and vomiting  5/9/2024 0307 by Reyna Fletcher RN  Outcome: Progressing  5/8/2024 1312 by Melinda Flower RN  Outcome: Progressing  Goal: Maintains or returns to baseline bowel function  5/9/2024 0307 by Reyna Fletcher RN  Outcome: Progressing  5/8/2024 1312 by Melinda Flower RN  Outcome: Progressing  Goal: Maintains adequate nutritional intake  5/9/2024 0307 by Reyna Fletcher RN  Outcome: Progressing  5/8/2024 1312 by Melinda Flower RN  Outcome: Progressing     Problem: Genitourinary - Adult  Goal: Urinary catheter remains patent  5/9/2024 0307 by Reyna Fletcher RN  Outcome: Progressing  5/8/2024 1312 by Melinda Flower RN  Outcome: Progressing     Problem: ABCDS Injury Assessment  Goal: Absence of physical injury  5/9/2024 0307 by Reyna Fletcher RN  Outcome: Progressing  5/8/2024 1312 by Melinda Flower RN  Outcome: Progressing

## 2024-05-09 NOTE — CARE COORDINATION
Chart reviewed.    CM met f/f with patient for HH choice. Original preference given for [facility]. CM explained [facility] does not cover this area and that [facility] would be equivalent agency. Patient is agreeable to [facility] for post hospital care.    Choice letter signed and placed on chart.    Referral sent via [facility].     1100: [facility] accepted. --Anticipated Start of Care 05-

## 2024-05-10 LAB
ANION GAP SERPL CALC-SCNC: 5 MMOL/L (ref 5–15)
BUN SERPL-MCNC: 12 MG/DL (ref 6–20)
BUN/CREAT SERPL: 11 (ref 12–20)
CA-I BLD-MCNC: 8.7 MG/DL (ref 8.5–10.1)
CHLORIDE SERPL-SCNC: 113 MMOL/L (ref 97–108)
CO2 SERPL-SCNC: 22 MMOL/L (ref 21–32)
CREAT SERPL-MCNC: 1.1 MG/DL (ref 0.55–1.02)
ERYTHROCYTE [DISTWIDTH] IN BLOOD BY AUTOMATED COUNT: 14 % (ref 11.5–14.5)
GLUCOSE SERPL-MCNC: 136 MG/DL (ref 65–100)
HCT VFR BLD AUTO: 35.3 % (ref 35–47)
HGB BLD-MCNC: 11.9 G/DL (ref 11.5–16)
MCH RBC QN AUTO: 30.4 PG (ref 26–34)
MCHC RBC AUTO-ENTMCNC: 33.7 G/DL (ref 30–36.5)
MCV RBC AUTO: 90.1 FL (ref 80–99)
NRBC # BLD: 0 K/UL (ref 0–0.01)
NRBC BLD-RTO: 0 PER 100 WBC
PLATELET # BLD AUTO: 287 K/UL (ref 150–400)
PMV BLD AUTO: 11.2 FL (ref 8.9–12.9)
POTASSIUM SERPL-SCNC: 3.7 MMOL/L (ref 3.5–5.1)
RBC # BLD AUTO: 3.92 M/UL (ref 3.8–5.2)
SODIUM SERPL-SCNC: 140 MMOL/L (ref 136–145)
WBC # BLD AUTO: 12.7 K/UL (ref 3.6–11)

## 2024-05-10 PROCEDURE — 99024 POSTOP FOLLOW-UP VISIT: CPT | Performed by: UROLOGY

## 2024-05-10 PROCEDURE — 6360000002 HC RX W HCPCS: Performed by: NURSE PRACTITIONER

## 2024-05-10 PROCEDURE — 6370000000 HC RX 637 (ALT 250 FOR IP): Performed by: NURSE PRACTITIONER

## 2024-05-10 PROCEDURE — 80048 BASIC METABOLIC PNL TOTAL CA: CPT

## 2024-05-10 PROCEDURE — 82570 ASSAY OF URINE CREATININE: CPT

## 2024-05-10 PROCEDURE — 2500000003 HC RX 250 WO HCPCS: Performed by: NURSE PRACTITIONER

## 2024-05-10 PROCEDURE — 36415 COLL VENOUS BLD VENIPUNCTURE: CPT

## 2024-05-10 PROCEDURE — 1100000000 HC RM PRIVATE

## 2024-05-10 PROCEDURE — 85027 COMPLETE CBC AUTOMATED: CPT

## 2024-05-10 RX ORDER — DOCUSATE SODIUM 100 MG/1
100 CAPSULE, LIQUID FILLED ORAL 2 TIMES DAILY
Status: DISCONTINUED | OUTPATIENT
Start: 2024-05-11 | End: 2024-05-11 | Stop reason: HOSPADM

## 2024-05-10 RX ORDER — FAMOTIDINE 20 MG/1
20 TABLET, FILM COATED ORAL DAILY
Status: DISCONTINUED | OUTPATIENT
Start: 2024-05-10 | End: 2024-05-11 | Stop reason: HOSPADM

## 2024-05-10 RX ORDER — BISACODYL 10 MG
10 SUPPOSITORY, RECTAL RECTAL DAILY PRN
Status: DISCONTINUED | OUTPATIENT
Start: 2024-05-10 | End: 2024-05-11 | Stop reason: HOSPADM

## 2024-05-10 RX ORDER — OXYCODONE HYDROCHLORIDE AND ACETAMINOPHEN 5; 325 MG/1; MG/1
1 TABLET ORAL EVERY 4 HOURS PRN
Status: DISCONTINUED | OUTPATIENT
Start: 2024-05-10 | End: 2024-05-11 | Stop reason: HOSPADM

## 2024-05-10 RX ORDER — CIPROFLOXACIN 500 MG/1
500 TABLET, FILM COATED ORAL ONCE
Status: DISCONTINUED | OUTPATIENT
Start: 2024-05-10 | End: 2024-05-11 | Stop reason: HOSPADM

## 2024-05-10 RX ADMIN — LEVOFLOXACIN 500 MG: 5 INJECTION, SOLUTION INTRAVENOUS at 09:28

## 2024-05-10 RX ADMIN — POTASSIUM CHLORIDE, DEXTROSE MONOHYDRATE AND SODIUM CHLORIDE: 150; 5; 450 INJECTION, SOLUTION INTRAVENOUS at 19:55

## 2024-05-10 RX ADMIN — SIMETHICONE 80 MG: 80 TABLET, CHEWABLE ORAL at 09:29

## 2024-05-10 RX ADMIN — POTASSIUM CHLORIDE, DEXTROSE MONOHYDRATE AND SODIUM CHLORIDE: 150; 5; 450 INJECTION, SOLUTION INTRAVENOUS at 09:28

## 2024-05-10 RX ADMIN — LEVOTHYROXINE SODIUM 100 MCG: 0.03 TABLET ORAL at 09:28

## 2024-05-10 RX ADMIN — FAMOTIDINE 20 MG: 20 TABLET, FILM COATED ORAL at 09:28

## 2024-05-10 ASSESSMENT — ENCOUNTER SYMPTOMS: ABDOMINAL PAIN: 1

## 2024-05-10 ASSESSMENT — PAIN SCALES - GENERAL
PAINLEVEL_OUTOF10: 0
PAINLEVEL_OUTOF10: 0

## 2024-05-10 NOTE — PROGRESS NOTES
UROLOGY Progress Note         [phone]      Daily Progress Note: 5/10/2024    Subjective:   The patient is seen for UROLOGIC follow up after cystectomy and ileal conduit diversion on 5/6/24.    She is POD 4 today.    Conduit output: 1650 cc  Drain output: 750 cc    Completed Metronidazole and remains on Levaquin through 5/11/24.  5/1/24 culture with aerococcus urinae A; on keflex pre-op.  5/6/24 culture with no growth.  WBC 12.7 today; afebrile.    Creatinine today 1.10. Up slightly.     Had BM x 3 (small, soft) overnight and has passed flatus. She is feeling much better. Abdomen in less distended.    Stoma flat.  Conduit draining yellow, red tinged urine.  Zhao remains in stoma.  TruClose drain with serosanguinous drainage.    Problem List:  Patient Active Problem List   Diagnosis    Radiation cystitis    Malignant neoplasm of overlapping sites of bladder (HCC)    Malignant neoplasm of overlapping sites of cervix (HCC)    Bladder cancer (HCC)       Medications reviewed  Current Facility-Administered Medications   Medication Dose Route Frequency    bisacodyl (DULCOLAX) suppository 10 mg  10 mg Rectal Daily PRN    famotidine (PEPCID) tablet 20 mg  20 mg Oral Daily    [START ON 5/11/2024] docusate sodium (COLACE) capsule 100 mg  100 mg Oral BID    oxyCODONE-acetaminophen (PERCOCET) 5-325 MG per tablet 1 tablet  1 tablet Oral Q4H PRN    levothyroxine (SYNTHROID) tablet 100 mcg  100 mcg Oral Daily    hydrOXYzine pamoate (VISTARIL) capsule 25 mg  25 mg Oral TID PRN    diphenhydrAMINE (BENADRYL) injection 25 mg  25 mg IntraVENous Q6H PRN    simethicone (MYLICON) chewable tablet 80 mg  80 mg Oral 4x Daily    dextrose 5 % and 0.45 % NaCl with KCl 20 mEq infusion   IntraVENous Continuous    acetaminophen (TYLENOL) tablet 650 mg  650 mg Oral Q4H PRN    ondansetron (ZOFRAN) injection 4 mg  4 mg IntraVENous Q6H PRN    prochlorperazine (COMPAZINE) injection 10 mg  10 mg IntraVENous Q6H PRN    hydrALAZINE

## 2024-05-10 NOTE — WOUND CARE
WCN met with patient for ostomy pouch change and education, patient states that she has been emptying her pouch on her own throughout the day. Went over steps for changing pouch and how to measure stoma. Patient stoma is more oval in shape and it is also darker in color but moist. Ostomy nurse noted that patient daniel had come out of stoma and was in pouch. Update about daniel and picture of stoma securely sent Chasidy West NP.    Two piece convex wafer applied to stoma and then pouch applied. No leaks noted at this time.     Discussed the following education:  -Measuring the stoma  -Cutting wafer to fit stoma and wafer and pouch application.  -Empty / burp pouch when 1/3-1/2 full of stool or gas.  -Change appliance (wafer and pouch) 2-3 per week.  If leaking, change as soon as possible to prevent skin irritation.  -Best time for routine change of appliance is first thing in the morning before consuming food or liquids (depending on which type of ostomy).  -Clean stoma and peristomal skin with plain water or NS, may use a mild soap.  No soaps with lotions as they may prevent adhesive from adhering to skin.  May bathe with appliance on or off.  -Purposes and how to use barrier rings, stoma paste, and stoma powder.   -Stoma should always be red and moist.  If stoma appears dry and dusky, notify surgeon immediately.     [employer] supply form completed and placed in patients chart for case management and provider to sign.

## 2024-05-11 VITALS
BODY MASS INDEX: 28.92 KG/M2 | DIASTOLIC BLOOD PRESSURE: 84 MMHG | HEART RATE: 92 BPM | HEIGHT: 68 IN | WEIGHT: 190.8 LBS | TEMPERATURE: 97.9 F | SYSTOLIC BLOOD PRESSURE: 118 MMHG | OXYGEN SATURATION: 99 % | RESPIRATION RATE: 18 BRPM

## 2024-05-11 LAB
CREAT FLD-MCNC: 1.07 MG/DL
ERYTHROCYTE [DISTWIDTH] IN BLOOD BY AUTOMATED COUNT: 14 % (ref 11.5–14.5)
HCT VFR BLD AUTO: 35.3 % (ref 35–47)
HGB BLD-MCNC: 11.8 G/DL (ref 11.5–16)
MCH RBC QN AUTO: 30.3 PG (ref 26–34)
MCHC RBC AUTO-ENTMCNC: 33.4 G/DL (ref 30–36.5)
MCV RBC AUTO: 90.5 FL (ref 80–99)
NRBC # BLD: 0 K/UL (ref 0–0.01)
NRBC BLD-RTO: 0 PER 100 WBC
PLATELET # BLD AUTO: 306 K/UL (ref 150–400)
PMV BLD AUTO: 11.4 FL (ref 8.9–12.9)
RBC # BLD AUTO: 3.9 M/UL (ref 3.8–5.2)
SPECIMEN SOURCE FLD: NORMAL
WBC # BLD AUTO: 11.2 K/UL (ref 3.6–11)

## 2024-05-11 PROCEDURE — 85027 COMPLETE CBC AUTOMATED: CPT

## 2024-05-11 PROCEDURE — 6360000002 HC RX W HCPCS: Performed by: NURSE PRACTITIONER

## 2024-05-11 PROCEDURE — APPNB45 APP NON BILLABLE 31-45 MINUTES: Performed by: NURSE PRACTITIONER

## 2024-05-11 PROCEDURE — 6370000000 HC RX 637 (ALT 250 FOR IP): Performed by: NURSE PRACTITIONER

## 2024-05-11 RX ORDER — OXYCODONE HYDROCHLORIDE AND ACETAMINOPHEN 5; 325 MG/1; MG/1
1 TABLET ORAL EVERY 4 HOURS PRN
Qty: 10 TABLET | Refills: 0 | Status: SHIPPED | OUTPATIENT
Start: 2024-05-11 | End: 2024-05-25

## 2024-05-11 RX ORDER — LEVOFLOXACIN 250 MG/1
500 TABLET, FILM COATED ORAL DAILY
Qty: 6 TABLET | Refills: 0 | Status: SHIPPED | OUTPATIENT
Start: 2024-05-11 | End: 2024-05-11

## 2024-05-11 RX ORDER — LIDOCAINE 4 G/G
1 PATCH TOPICAL DAILY
Qty: 1 EACH | Refills: 0 | Status: SHIPPED | OUTPATIENT
Start: 2024-05-12 | End: 2024-05-13

## 2024-05-11 RX ORDER — BISACODYL 10 MG
10 SUPPOSITORY, RECTAL RECTAL DAILY PRN
Qty: 10 SUPPOSITORY | Refills: 0 | Status: SHIPPED | OUTPATIENT
Start: 2024-05-11 | End: 2024-05-21

## 2024-05-11 RX ORDER — LEVOFLOXACIN 250 MG/1
500 TABLET, FILM COATED ORAL DAILY
Qty: 14 TABLET | Refills: 0 | Status: SHIPPED | OUTPATIENT
Start: 2024-05-11 | End: 2024-05-18

## 2024-05-11 RX ADMIN — SIMETHICONE 80 MG: 80 TABLET, CHEWABLE ORAL at 12:23

## 2024-05-11 RX ADMIN — FAMOTIDINE 20 MG: 20 TABLET, FILM COATED ORAL at 08:31

## 2024-05-11 RX ADMIN — DOCUSATE SODIUM 100 MG: 100 CAPSULE, LIQUID FILLED ORAL at 08:30

## 2024-05-11 RX ADMIN — LEVOFLOXACIN 500 MG: 5 INJECTION, SOLUTION INTRAVENOUS at 08:31

## 2024-05-11 RX ADMIN — SIMETHICONE 80 MG: 80 TABLET, CHEWABLE ORAL at 08:31

## 2024-05-11 RX ADMIN — LEVOTHYROXINE SODIUM 100 MCG: 0.03 TABLET ORAL at 07:52

## 2024-05-11 NOTE — PROGRESS NOTES
Discharge plan of care/case management plan validated with provider discharge order. Discharge to home with home health. Discharge medications reviewed with the patient and appropriate educational materials and side effects teaching were provided. Discharge with Accordion drain and urostomy; Provided instructions for care and maintenance of accordion drain and urostomy. Removed intravenous access prior to discharge. Transportation care of patient's son.

## 2024-05-11 NOTE — DISCHARGE INSTRUCTIONS
I have placed a referral for you to the [facility].  The contact and location information is below.  Our referral coordinator will start working on that immediately (Chacha).  You can call her with questions at [phone].     This will be a great resource for you.  They can assist you with long term care and obtaining supplies.     [facility]  [address]  [address]  [place], [place] [address]  Tel: [phone]  Fax: [phone]     You can also obtain supplies from [facility].  They are located in the shopping center across from the [place] office.  [address] [place] [address], [address]  [place] [address] [phone]     If you need to reach the Wound and Ostomy Nurse who saw you as an inpatient, you may call the hospital and ask to speak with her.  She will have enrolled you in a program to help with supplies and provided you with a folder full of information.  Her name is Emerita Peña/Callie Betancourt.    They are the only Wound Nurses for [facility].  You may have to them a message.  Hospital Phone is [phone], ext. [phone]    You will also need to be seen next week for drain removal.  Marii will give you a call on Monday or Tuesday to see how much output you are having via that drain.  Please keep track of that.    You will stay on antibiotics until your drain is removed.  Please pick them up from the pharmacy!!

## 2024-05-11 NOTE — PLAN OF CARE
Problem: Pain  Goal: Verbalizes/displays adequate comfort level or baseline comfort level  5/11/2024 1246 by Caesy Lares RN  Outcome: Progressing  5/11/2024 0232 by Brandy Camilo LPN  Outcome: Progressing     Problem: Safety - Adult  Goal: Free from fall injury  5/11/2024 1246 by Casey Lares RN  Outcome: Progressing  5/11/2024 0232 by Brandy Camilo LPN  Outcome: Progressing     Problem: Discharge Planning  Goal: Discharge to home or other facility with appropriate resources  Outcome: Progressing  Flowsheets (Taken 5/11/2024 0800)  Discharge to home or other facility with appropriate resources: Identify barriers to discharge with patient and caregiver

## 2024-05-11 NOTE — DISCHARGE SUMMARY
UROLOGY         [phone] FAX    PROVIDER DISCHARGE SUMMARY     Patient ID:    Daisy Michaud  [id number]  63 y.o.  1960    Admit date: 5/6/2024    Discharge date : 5/11/2024    Diagnoses: bladder cancer    Reason for Hospitalization: surgery, or treatment for above mentioned diagnosis.    Procedures: cystectomy and ileal conduit diversion on 5/6/24.     Hospital Course: Uncomplicated hospital course following surgery. She has done very well.  Stoma is flat, dark.  Zhao via stoma was replaced 5/10/24.  Pain is well-controlled.  She has required minimal narcotics.  Patient is tolerating a diet.  Patient is ambulatory.    She has worked with Wound Team.  Supplies per discharge order, signed by Blanca Chavarria NP.  Labs are reviewed and felt to be satisfactory for discharge.  Incision sites are clean, dry and intact.    Follow up Care:    Your appointment with us is on: TBD; will see patient next week for drain removal.  Office will call Monday, 5/13/24 to schedule.    Discharge Medications:      Medication List        START taking these medications      bisacodyl 10 MG suppository  Commonly known as: DULCOLAX  Place 1 suppository rectally daily as needed for Constipation     levoFLOXacin 250 MG tablet  Commonly known as: Levaquin  Take 2 tablets by mouth daily for 7 days     lidocaine 4 % external patch  Place 1 patch onto the skin daily for 1 dose  Start taking on: May 12, 2024     oxyCODONE-acetaminophen 5-325 MG per tablet  Commonly known as: PERCOCET  Take 1 tablet by mouth every 4 hours as needed for Pain for up to 14 days. Max Daily Amount: 6 tablets            CONTINUE taking these medications      levothyroxine 100 MCG tablet  Commonly known as: SYNTHROID     ZyrTEC Allergy 10 MG Caps  Generic drug: Cetirizine HCl            STOP taking these medications      cephALEXin 250 MG capsule  Commonly known as: KEFLEX     phenazopyridine 100 MG tablet  Commonly known as: Pyridium    excuse any errors that have escaped final proofreading.  Thank you.

## 2024-05-11 NOTE — CARE COORDINATION
Patient is clear to d/c from CM to home with HH, accepted with [facility], SOC 24-48 hours after d/c.    Transition of Care Plan:    RUR: 9%  Prior Level of Functioning: indep  Disposition: home health  If SNF or IPR: Date FOC offered:   Date FOC received:   Accepting facility: [facility]  Date authorization started with reference number:   Date authorization received and expires:   Follow up appointments:   DME needed: n/a  Transportation at discharge: son  IM/IMM Medicare/ letter given: yes  Is patient a  and connected with [facility]?    If yes, was [place] transfer form completed and [facility] notified?   Caregiver Contact:   Discharge Caregiver contacted prior to discharge? Patient aware  Care Conference needed?   Barriers to discharge: n/a

## 2024-05-11 NOTE — DISCHARGE INSTR - COC
Continuity of Care Form    Patient Name: Daisy Michaud   :  1960  MRN:  [id number]    Admit date:  2024  Discharge date:  2024    Code Status Order: Full Code   Advance Directives:   Advance Care Flowsheet Documentation       Date/Time Healthcare Directive Type of Healthcare Directive Copy in Chart Healthcare Agent Appointed Healthcare Agent's Name Healthcare Agent's Phone Number    24 [phone] No, patient does not have an advance directive for healthcare treatment -- -- -- -- --            Admitting Physician:  Rakesh Joseph MD  PCP: Maureen Mckenna MD    Discharging Nurse: Casey Lares  Discharging Hospital Unit/Room#: 513/01  Discharging Unit Phone Number: [phone]    Emergency Contact:   Extended Emergency Contact Information  Primary Emergency Contact: Serjio Schafer  Address: [address] [place] [address]  Home Phone: [phone]  Work Phone: [phone]  Mobile Phone: [phone]  Relation: Child  Secondary Emergency Contact: Eloise Schafer  Address: [id number] [place] [address]  Home Phone: [phone]  Mobile Phone: [phone]  Relation: Daughter-in-Law    Past Surgical History:  Past Surgical History:   Procedure Laterality Date    ANOMALOUS VENOUS RETURN REPAIR N/A 2024    CYSTOURETHROSCOPY AND TRANSURETHRAL RESECTION BLADDER TUMOR WITH MITOMYCIN INSTILLATION performed by Jerome Abarca MD at [facility] MAIN OR    BLADDER SURGERY N/A 2024    ROBOTIC ASSISTED LAPAROSCOPIC CYSTECTOMY WITH URETEROILEAL CONDUIT INCLUDING INTESTINE ANASTOMOSIS AND LYSIS OF ADHESIONS performed by Rakesh Joseph MD at [facility] MAIN OR    BLADDER TUMOR EXCISION      CYSTOSCOPY  2024    TOTAL VAGINAL HYSTERECTOMY      URETERAL REIMPLANTION Bilateral     Age 7 and Age 20    WISDOM TOOTH EXTRACTION      Age 15       Immunization History:     There is no immunization history on file for this patient.    Active

## 2024-05-11 NOTE — TELEPHONE ENCOUNTER
Change of plans- I need you to coordinate apt with patient.  She was discharged from [facility] today- Saturday, 5/11/24.    Please give her a call!

## 2024-05-15 ENCOUNTER — OFFICE VISIT (OUTPATIENT)
Age: 64
End: 2024-05-15

## 2024-05-15 VITALS
SYSTOLIC BLOOD PRESSURE: 134 MMHG | BODY MASS INDEX: 28.79 KG/M2 | OXYGEN SATURATION: 99 % | HEIGHT: 68 IN | DIASTOLIC BLOOD PRESSURE: 88 MMHG | RESPIRATION RATE: 14 BRPM | WEIGHT: 190 LBS | HEART RATE: 87 BPM

## 2024-05-15 DIAGNOSIS — C67.8 MALIGNANT NEOPLASM OF OVERLAPPING SITES OF BLADDER (HCC): Primary | ICD-10-CM

## 2024-05-15 PROBLEM — N30.40 RADIATION CYSTITIS: Status: RESOLVED | Noted: 2024-03-18 | Resolved: 2024-05-15

## 2024-05-15 PROBLEM — C67.9 BLADDER CANCER (HCC): Status: RESOLVED | Noted: 2024-05-06 | Resolved: 2024-05-15

## 2024-05-15 PROCEDURE — 99024 POSTOP FOLLOW-UP VISIT: CPT | Performed by: UROLOGY

## 2024-05-15 NOTE — PROGRESS NOTES
Chief Complaint   Patient presents with    Follow-up     Malignant neoplasm of overlapping sites of bladder     1. Have you been to the ER, urgent care clinic since your last visit?  Hospitalized since your last visit?No    2. Have you seen or consulted any other health care providers outside of the [facility] since your last visit?  Include any pap smears or colon screening. No

## 2024-05-15 NOTE — PROGRESS NOTES
HISTORY OF PRESENT ILLNESS  Daisy Michaud is a 63 y.o. female.   has a past medical history of Arthritis, Bladder cancer (HCC), Cervical cancer (HCC), and Thyroid disease.  has a past surgical history that includes Cystocopy (04/03/2024); Total vaginal hysterectomy; bladder tumor excision; Ureteral reimplantion (Bilateral); [place] tooth extraction; Anomalous venous return repair (N/A, 4/5/2024); and Bladder surgery (N/A, 5/6/2024).  Chief Complaint   Patient presents with    Follow-up     Stem site from surgery    need ostomy supplies     S/p robotic cystectomy 5/6/24.  Pathology contained to bladder.  LN negative.     Doing well.  No pain.  Normal appetite and Bms.         1. Malignant neoplasm of overlapping sites of bladder (HCC)  Overview:  HX of bladder cancer, recurrent since 2018 or 2017 with Dr Vazquez.   She is s/p TURBT ([facility]) 4/5/24; pathology significant for invasive high-grade urothelial carcinoma.   Assessment & Plan:   S/p cystectomy and ileal conduit 5/6/24.  Reverse 7 conduit.  H/o pelvic radiation. Pathology contained to bladder.   Observe stoma.  Distal necrosis but healing in.  Continue daniel in conduit for now.  Retained ureteral stents for now.   Weekly wound checks  VIJAY removed today  Doing well.        Allergies   Allergen Reactions    Codeine Nausea Only      Prior to Admission medications    Medication Sig Start Date End Date Taking? Authorizing Provider   oxyCODONE-acetaminophen (PERCOCET) 5-325 MG per tablet Take 1 tablet by mouth every 4 hours as needed for Pain for up to 14 days. Max Daily Amount: 6 tablets 5/11/24 5/25/24 Yes Marii Chand APRN - NP   bisacodyl (DULCOLAX) 10 MG suppository Place 1 suppository rectally daily as needed for Constipation 5/11/24 5/21/24 Yes Marii Chand APRN - NP   levoFLOXacin (LEVAQUIN) 250 MG tablet Take 2 tablets by mouth daily for 7 days 5/11/24 5/18/24 Yes Marii Chand APRN - NP   Cetirizine HCl (ZYRTEC ALLERGY) 10 MG CAPS

## 2024-05-15 NOTE — PROGRESS NOTES
Chief Complaint   Patient presents with    Follow-up     Stem site from surgery     1. Have you been to the ER, urgent care clinic since your last visit?  Hospitalized since your last visit?No    2. Have you seen or consulted any other health care providers outside of the [facility] since your last visit?  Include any pap smears or colon screening. No  /88   Pulse 87   Resp 14   Ht 1.727 m (5' 7.99\")   Wt 86.2 kg (190 lb)   SpO2 99%   BMI 28.90 kg/m²

## 2024-05-15 NOTE — ASSESSMENT & PLAN NOTE
S/p cystectomy and ileal conduit 5/6/24.  Reverse 7 conduit.  H/o pelvic radiation. Pathology contained to bladder.   Observe stoma.  Distal necrosis but healing in.  Continue daniel in conduit for now.  Retained ureteral stents for now.   Weekly wound checks  VIJAY removed today  Doing well.

## 2024-05-19 ENCOUNTER — PATIENT MESSAGE (OUTPATIENT)
Age: 64
End: 2024-05-19

## 2024-05-20 NOTE — TELEPHONE ENCOUNTER
Pt lvm to touch base, I called and informed her it has been forwarded to yaneli and we are waiting for advice.

## 2024-05-22 PROBLEM — Z48.89 ENCOUNTER FOR POST SURGICAL WOUND CHECK: Status: ACTIVE | Noted: 2024-05-22

## 2024-05-22 PROBLEM — Z96.0 URETERAL STENT RETAINED: Status: ACTIVE | Noted: 2024-05-22

## 2024-05-23 ENCOUNTER — OFFICE VISIT (OUTPATIENT)
Age: 64
End: 2024-05-23

## 2024-05-23 VITALS — SYSTOLIC BLOOD PRESSURE: 156 MMHG | DIASTOLIC BLOOD PRESSURE: 87 MMHG | HEART RATE: 78 BPM

## 2024-05-23 DIAGNOSIS — Z48.89 ENCOUNTER FOR POST SURGICAL WOUND CHECK: Primary | ICD-10-CM

## 2024-05-23 DIAGNOSIS — C67.8 MALIGNANT NEOPLASM OF OVERLAPPING SITES OF BLADDER (HCC): ICD-10-CM

## 2024-05-23 DIAGNOSIS — Z96.0 URETERAL STENT RETAINED: ICD-10-CM

## 2024-05-23 PROCEDURE — 99024 POSTOP FOLLOW-UP VISIT: CPT | Performed by: NURSE PRACTITIONER

## 2024-05-23 NOTE — PROGRESS NOTES
HISTORY OF PRESENT ILLNESS  Daisy Michaud is a 63 y.o. female.  Chief Complaint   Patient presents with    Follow-up     HPI  S/p cystectomy and ileal conduit 5/6/24.  Reverse 7 conduit.  H/o pelvic radiation. Pathology contained to bladder.     Here today for stoma check.    She has received several different options for supplies.  She has not attempted to change her bag. She has HH once a week now.  We changed the bag today in clinic.  Problems with adherence initially. Second bag did ok.    Stoma is still dusky, brownish pink in color.  All areas of distal necrosis appear to be gone.  Zhao via stoma.    PAST MEDICAL HISTORY:  PMHx (including negatives):  has a past medical history of Arthritis, Bladder cancer (HCC), Cervical cancer (HCC), and Thyroid disease.   PSurgHx:  has a past surgical history that includes Cystocopy (04/03/2024); Total vaginal hysterectomy; bladder tumor excision; Ureteral reimplantion (Bilateral); [place] tooth extraction; Anomalous venous return repair (N/A, 4/5/2024); and Bladder surgery (N/A, 5/6/2024).  PSocHx:  reports that she has quit smoking. Her smoking use included cigarettes. She has never used smokeless tobacco. She reports that she does not currently use alcohol. She reports current drug use. Drug: Marijuana (Weed).     ASSESSMENT AND PLAN    Encounter for post surgical wound check  Ureteral stent retained  Malignant neoplasm of overlapping sites of bladder (HCC)      Return in about 1 week (around 5/30/2024) for stoma check.     Marii Chand, NEFTALI - NP      Daisy Michaud may have a reminder for a \"due or due soon\" health maintenance. The patient has been encouraged to contact their primary care provider for follow-up on this health maintenance or other necessary and/or routine health screening.     Please note that portions of this note were completed with Dragon dictation, the computer voice recognition software.  Quite often unanticipated grammatical, syntax,

## 2024-05-23 NOTE — PROGRESS NOTES
Chief Complaint   Patient presents with    Follow-up      1. Have you been to the ER, urgent care clinic since your last visit?  Hospitalized since your last visit?No    2. Have you seen or consulted any other health care providers outside of the [facility] since your last visit?  Include any pap smears or colon screening. No    BP (!) 156/87 (Site: Left Upper Arm, Position: Sitting, Cuff Size: Medium Adult)   Pulse 78

## 2024-06-04 ENCOUNTER — TELEPHONE (OUTPATIENT)
Age: 64
End: 2024-06-04

## 2024-06-04 NOTE — TELEPHONE ENCOUNTER
Attempted to call pt and inform her of previous encounter, she did not answer and I was unable to leave a voicemail.

## 2024-06-06 PROBLEM — Z85.41 HISTORY OF CERVICAL CANCER: Status: ACTIVE | Noted: 2024-04-23

## 2024-06-11 ENCOUNTER — PROCEDURE VISIT (OUTPATIENT)
Age: 64
End: 2024-06-11
Payer: MEDICARE

## 2024-06-11 VITALS
BODY MASS INDEX: 28.89 KG/M2 | HEIGHT: 68 IN | SYSTOLIC BLOOD PRESSURE: 150 MMHG | HEART RATE: 78 BPM | DIASTOLIC BLOOD PRESSURE: 78 MMHG

## 2024-06-11 DIAGNOSIS — Z48.89 ENCOUNTER FOR POST SURGICAL WOUND CHECK: ICD-10-CM

## 2024-06-11 DIAGNOSIS — C67.8 MALIGNANT NEOPLASM OF OVERLAPPING SITES OF BLADDER (HCC): Primary | ICD-10-CM

## 2024-06-11 DIAGNOSIS — Z96.0 URETERAL STENT RETAINED: ICD-10-CM

## 2024-06-11 DIAGNOSIS — T85.858A STENOSIS OF ILEAL CONDUIT STOMA, INITIAL ENCOUNTER: ICD-10-CM

## 2024-06-11 DIAGNOSIS — C67.8 MALIGNANT NEOPLASM OF OVERLAPPING SITES OF BLADDER (HCC): ICD-10-CM

## 2024-06-11 PROCEDURE — 44380 SMALL BOWEL ENDOSCOPY BR/WA: CPT | Performed by: UROLOGY

## 2024-06-11 PROCEDURE — 99024 POSTOP FOLLOW-UP VISIT: CPT | Performed by: UROLOGY

## 2024-06-11 RX ORDER — CIPROFLOXACIN 500 MG/1
500 TABLET, FILM COATED ORAL 2 TIMES DAILY
Qty: 6 TABLET | Refills: 0 | Status: SHIPPED | OUTPATIENT
Start: 2024-06-11

## 2024-06-11 NOTE — ASSESSMENT & PLAN NOTE
Her stoma is flush with some retraction.  The edges are viable.  If this stays static, no intervention is needed.

## 2024-06-11 NOTE — ASSESSMENT & PLAN NOTE
Status post cystectomy and ileal conduit 5/6/2024.  Pathology is favorable with contained CIS and tumor necrosis from prior therapy.  No evidence of extravesical spread.

## 2024-06-26 LAB
ALBUMIN SERPL-MCNC: 4.1 G/DL (ref 3.9–4.9)
ALP SERPL-CCNC: 78 IU/L (ref 44–121)
ALT SERPL-CCNC: 10 IU/L (ref 0–32)
AST SERPL-CCNC: 12 IU/L (ref 0–40)
BASOPHILS # BLD AUTO: 0.1 X10E3/UL (ref 0–0.2)
BASOPHILS NFR BLD AUTO: 1 %
BILIRUB SERPL-MCNC: 0.4 MG/DL (ref 0–1.2)
BUN SERPL-MCNC: 20 MG/DL (ref 8–27)
BUN/CREAT SERPL: 18 (ref 12–28)
CALCIUM SERPL-MCNC: 9.2 MG/DL (ref 8.7–10.3)
CHLORIDE SERPL-SCNC: 108 MMOL/L (ref 96–106)
CO2 SERPL-SCNC: 23 MMOL/L (ref 20–29)
CREAT SERPL-MCNC: 1.1 MG/DL (ref 0.57–1)
EGFRCR SERPLBLD CKD-EPI 2021: 56 ML/MIN/1.73
EOSINOPHIL # BLD AUTO: 0.4 X10E3/UL (ref 0–0.4)
EOSINOPHIL NFR BLD AUTO: 8 %
ERYTHROCYTE [DISTWIDTH] IN BLOOD BY AUTOMATED COUNT: 14.4 % (ref 11.7–15.4)
GLOBULIN SER CALC-MCNC: 2.5 G/DL (ref 1.5–4.5)
GLUCOSE SERPL-MCNC: 113 MG/DL (ref 70–99)
HCT VFR BLD AUTO: 34.5 % (ref 34–46.6)
HGB BLD-MCNC: 11.2 G/DL (ref 11.1–15.9)
IMM GRANULOCYTES # BLD AUTO: 0 X10E3/UL (ref 0–0.1)
IMM GRANULOCYTES NFR BLD AUTO: 0 %
LYMPHOCYTES # BLD AUTO: 1.5 X10E3/UL (ref 0.7–3.1)
LYMPHOCYTES NFR BLD AUTO: 26 %
MCH RBC QN AUTO: 29.5 PG (ref 26.6–33)
MCHC RBC AUTO-ENTMCNC: 32.5 G/DL (ref 31.5–35.7)
MCV RBC AUTO: 91 FL (ref 79–97)
MONOCYTES # BLD AUTO: 0.5 X10E3/UL (ref 0.1–0.9)
MONOCYTES NFR BLD AUTO: 9 %
NEUTROPHILS # BLD AUTO: 3.1 X10E3/UL (ref 1.4–7)
NEUTROPHILS NFR BLD AUTO: 56 %
PLATELET # BLD AUTO: 271 X10E3/UL (ref 150–450)
POTASSIUM SERPL-SCNC: 4.5 MMOL/L (ref 3.5–5.2)
PROT SERPL-MCNC: 6.6 G/DL (ref 6–8.5)
RBC # BLD AUTO: 3.8 X10E6/UL (ref 3.77–5.28)
SODIUM SERPL-SCNC: 144 MMOL/L (ref 134–144)
WBC # BLD AUTO: 5.5 X10E3/UL (ref 3.4–10.8)

## 2024-08-08 PROBLEM — Z96.0 URETERAL STENT RETAINED: Status: RESOLVED | Noted: 2024-05-22 | Resolved: 2024-08-08

## 2024-08-08 PROBLEM — Z48.89 ENCOUNTER FOR POST SURGICAL WOUND CHECK: Status: RESOLVED | Noted: 2024-05-22 | Resolved: 2024-08-08

## 2024-08-09 ENCOUNTER — OFFICE VISIT (OUTPATIENT)
Age: 64
End: 2024-08-09
Payer: MEDICARE

## 2024-08-09 VITALS
HEIGHT: 68 IN | SYSTOLIC BLOOD PRESSURE: 145 MMHG | WEIGHT: 190 LBS | BODY MASS INDEX: 28.79 KG/M2 | HEART RATE: 67 BPM | DIASTOLIC BLOOD PRESSURE: 83 MMHG

## 2024-08-09 DIAGNOSIS — T85.858D STENOSIS OF ILEAL CONDUIT STOMA, SUBSEQUENT ENCOUNTER: ICD-10-CM

## 2024-08-09 DIAGNOSIS — C67.8 MALIGNANT NEOPLASM OF OVERLAPPING SITES OF BLADDER (HCC): Primary | ICD-10-CM

## 2024-08-09 PROCEDURE — G8427 DOCREV CUR MEDS BY ELIG CLIN: HCPCS | Performed by: UROLOGY

## 2024-08-09 PROCEDURE — 1090F PRES/ABSN URINE INCON ASSESS: CPT | Performed by: UROLOGY

## 2024-08-09 PROCEDURE — 3017F COLORECTAL CA SCREEN DOC REV: CPT | Performed by: UROLOGY

## 2024-08-09 PROCEDURE — 99214 OFFICE O/P EST MOD 30 MIN: CPT | Performed by: UROLOGY

## 2024-08-09 PROCEDURE — G8419 CALC BMI OUT NRM PARAM NOF/U: HCPCS | Performed by: UROLOGY

## 2024-08-09 PROCEDURE — 1036F TOBACCO NON-USER: CPT | Performed by: UROLOGY

## 2024-08-09 PROCEDURE — 1123F ACP DISCUSS/DSCN MKR DOCD: CPT | Performed by: UROLOGY

## 2024-08-09 PROCEDURE — G8400 PT W/DXA NO RESULTS DOC: HCPCS | Performed by: UROLOGY

## 2024-08-09 NOTE — PROGRESS NOTES
HISTORY OF PRESENT ILLNESS  Daisy Michaud is a 64 y.o. female.   has a past medical history of Arthritis, Bladder cancer (HCC), Cervical cancer (HCC), and Thyroid disease.  has a past surgical history that includes Cystocopy (04/03/2024); Total vaginal hysterectomy; bladder tumor excision; Ureteral reimplantion (Bilateral); [place] tooth extraction; Anomalous venous return repair (N/A, 04/05/2024); Bladder surgery (N/A, 05/06/2024); and Cystocopy (06/11/2024).  Chief Complaint   Patient presents with    Follow-up     Malignant neoplasm of overlapping sites of bladder     Stoma is retracted but viable.  She can have a little trouble centering her stoma bag but otherwise feels ok with it.  She can have some bleeding if she is aggressive removing adhesive.  She was not using skin barrier wipes.         1. Malignant neoplasm of overlapping sites of bladder (HCC)  Overview:  HX of bladder cancer, recurrent since 2018 or 2017 with Dr Vazquez.   She is s/p TURBT (Tevin) 4/5/24; pathology significant for invasive high-grade urothelial carcinoma.     S/p cystectomy and ileal conduit 5/6/24.  Reverse 7 conduit.  H/o pelvic radiation. 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   Assessment & Plan:  Doing well.  Plan on imaging and labs.  Plan on Mark testing.    Orders:  -     CT ABDOMEN PELVIS W WO CONTRAST Additional Contrast? None; Future  -     Comprehensive Metabolic Panel; Future  -     CBC with Auto Differential; Future  2. Stenosis of ileal conduit stoma, subsequent encounter  Overview:  Flush stoma with some evidence of retraction and viable edges.  Assessment & Plan:  Managing ok with stoma.  No concerns.       Allergies   Allergen Reactions    Codeine Nausea Only      Prior to Admission medications    Medication Sig Start Date End Date Taking? Authorizing Provider   levothyroxine (SYNTHROID) 100 MCG tablet Take 1 tablet by mouth Daily 3/9/24  Yes

## 2024-08-09 NOTE — PROGRESS NOTES
Chief Complaint   Patient presents with    Follow-up     Malignant neoplasm of overlapping sites of bladder     1. Have you been to the ER, urgent care clinic since your last visit?  Hospitalized since your last visit?No    2. Have you seen or consulted any other health care providers outside of the [facility] since your last visit?  Include any pap smears or colon screening. No  BP (!) 145/83   Pulse 67   Ht 1.727 m (5' 8\")   Wt 86.2 kg (190 lb)   BMI 28.89 kg/m²

## 2024-08-10 LAB
ALBUMIN SERPL-MCNC: 4.5 G/DL (ref 3.9–4.9)
ALP SERPL-CCNC: 76 IU/L (ref 44–121)
ALT SERPL-CCNC: 17 IU/L (ref 0–32)
AST SERPL-CCNC: 15 IU/L (ref 0–40)
BASOPHILS # BLD AUTO: 0.1 X10E3/UL (ref 0–0.2)
BASOPHILS NFR BLD AUTO: 1 %
BILIRUB SERPL-MCNC: 0.3 MG/DL (ref 0–1.2)
BUN SERPL-MCNC: 13 MG/DL (ref 8–27)
BUN/CREAT SERPL: 14 (ref 12–28)
CALCIUM SERPL-MCNC: 9.4 MG/DL (ref 8.7–10.3)
CHLORIDE SERPL-SCNC: 105 MMOL/L (ref 96–106)
CO2 SERPL-SCNC: 23 MMOL/L (ref 20–29)
CREAT SERPL-MCNC: 0.92 MG/DL (ref 0.57–1)
EGFRCR SERPLBLD CKD-EPI 2021: 69 ML/MIN/1.73
EOSINOPHIL # BLD AUTO: 0.5 X10E3/UL (ref 0–0.4)
EOSINOPHIL NFR BLD AUTO: 7 %
ERYTHROCYTE [DISTWIDTH] IN BLOOD BY AUTOMATED COUNT: 14.7 % (ref 11.7–15.4)
GLOBULIN SER CALC-MCNC: 2.4 G/DL (ref 1.5–4.5)
GLUCOSE SERPL-MCNC: 105 MG/DL (ref 70–99)
HCT VFR BLD AUTO: 37.8 % (ref 34–46.6)
IMM GRANULOCYTES # BLD AUTO: 0 X10E3/UL (ref 0–0.1)
IMM GRANULOCYTES NFR BLD AUTO: 0 %
LYMPHOCYTES # BLD AUTO: 1.2 X10E3/UL (ref 0.7–3.1)
LYMPHOCYTES NFR BLD AUTO: 17 %
MCH RBC QN AUTO: 28.9 PG (ref 26.6–33)
MCHC RBC AUTO-ENTMCNC: 31.5 G/DL (ref 31.5–35.7)
MCV RBC AUTO: 92 FL (ref 79–97)
MONOCYTES # BLD AUTO: 0.7 X10E3/UL (ref 0.1–0.9)
MONOCYTES NFR BLD AUTO: 10 %
NEUTROPHILS # BLD AUTO: 4.5 X10E3/UL (ref 1.4–7)
NEUTROPHILS NFR BLD AUTO: 65 %
PLATELET # BLD AUTO: 270 X10E3/UL (ref 150–450)
POTASSIUM SERPL-SCNC: 4.6 MMOL/L (ref 3.5–5.2)
PROT SERPL-MCNC: 6.9 G/DL (ref 6–8.5)
RBC # BLD AUTO: 4.12 X10E6/UL (ref 3.77–5.28)
SODIUM SERPL-SCNC: 140 MMOL/L (ref 134–144)
WBC # BLD AUTO: 7 X10E3/UL (ref 3.4–10.8)

## 2024-08-12 ENCOUNTER — TELEPHONE (OUTPATIENT)
Age: 64
End: 2024-08-12

## 2024-08-12 NOTE — TELEPHONE ENCOUNTER
Started Mark form       Status  Created  Cancer type  Bladder  Ordering Clinician  Dr. Rakesh Joseph  Ordering Clinic  [facility]    Result Date  --    Current Order  #[id number]  8/12/2024, 2:48 PM EDT  Camilla  6 months  Tissue Received Date  --  Tissue Case ID  --    Draw  1  Blood Draw Date  8/19/2024

## 2024-08-29 ENCOUNTER — HOSPITAL ENCOUNTER (OUTPATIENT)
Facility: HOSPITAL | Age: 64
End: 2024-08-29
Attending: UROLOGY
Payer: MEDICARE

## 2024-08-29 ENCOUNTER — HOSPITAL ENCOUNTER (EMERGENCY)
Facility: HOSPITAL | Age: 64
Discharge: HOME | End: 2024-08-29
Attending: STUDENT IN AN ORGANIZED HEALTH CARE EDUCATION/TRAINING PROGRAM
Payer: MEDICARE

## 2024-08-29 VITALS
TEMPERATURE: 98 F | HEART RATE: 61 BPM | OXYGEN SATURATION: 99 % | RESPIRATION RATE: 18 BRPM | SYSTOLIC BLOOD PRESSURE: 131 MMHG | DIASTOLIC BLOOD PRESSURE: 79 MMHG

## 2024-08-29 DIAGNOSIS — C67.8 MALIGNANT NEOPLASM OF OVERLAPPING SITES OF BLADDER (HCC): ICD-10-CM

## 2024-08-29 DIAGNOSIS — R42 LIGHTHEADEDNESS: Primary | ICD-10-CM

## 2024-08-29 LAB
ANION GAP SERPL CALC-SCNC: 7 MMOL/L (ref 5–15)
BASOPHILS # BLD: 0.1 K/UL (ref 0–0.1)
BASOPHILS NFR BLD: 1 % (ref 0–1)
BUN SERPL-MCNC: 17 MG/DL (ref 6–20)
BUN/CREAT SERPL: 16 (ref 12–20)
CA-I BLD-MCNC: 9.2 MG/DL (ref 8.5–10.1)
CHLORIDE SERPL-SCNC: 107 MMOL/L (ref 97–108)
CO2 SERPL-SCNC: 26 MMOL/L (ref 21–32)
CREAT SERPL-MCNC: 1.05 MG/DL (ref 0.55–1.02)
DIFFERENTIAL METHOD BLD: ABNORMAL
EOSINOPHIL # BLD: 0.4 K/UL (ref 0–0.4)
EOSINOPHIL NFR BLD: 6 % (ref 0–7)
ERYTHROCYTE [DISTWIDTH] IN BLOOD BY AUTOMATED COUNT: 15.5 % (ref 11.5–14.5)
GLUCOSE BLD STRIP.AUTO-MCNC: 117 MG/DL (ref 65–100)
GLUCOSE SERPL-MCNC: 115 MG/DL (ref 65–100)
HCT VFR BLD AUTO: 36.4 % (ref 35–47)
HGB BLD-MCNC: 11.7 G/DL (ref 11.5–16)
IMM GRANULOCYTES # BLD AUTO: 0 K/UL (ref 0–0.04)
IMM GRANULOCYTES NFR BLD AUTO: 0 % (ref 0–0.5)
LYMPHOCYTES # BLD: 1.4 K/UL (ref 0.8–3.5)
LYMPHOCYTES NFR BLD: 24 % (ref 12–49)
MCH RBC QN AUTO: 29.5 PG (ref 26–34)
MCHC RBC AUTO-ENTMCNC: 32.1 G/DL (ref 30–36.5)
MCV RBC AUTO: 91.7 FL (ref 80–99)
MONOCYTES # BLD: 0.6 K/UL (ref 0–1)
MONOCYTES NFR BLD: 10 % (ref 5–13)
NEUTS SEG # BLD: 3.5 K/UL (ref 1.8–8)
NEUTS SEG NFR BLD: 59 % (ref 32–75)
PERFORMED BY:: ABNORMAL
PLATELET # BLD AUTO: 241 K/UL (ref 150–400)
PMV BLD AUTO: 11.9 FL (ref 8.9–12.9)
POTASSIUM SERPL-SCNC: 4.3 MMOL/L (ref 3.5–5.1)
RBC # BLD AUTO: 3.97 M/UL (ref 3.8–5.2)
SODIUM SERPL-SCNC: 140 MMOL/L (ref 136–145)
TROPONIN I SERPL HS-MCNC: 5 NG/L (ref 0–51)
TROPONIN I SERPL HS-MCNC: 6 NG/L (ref 0–51)
WBC # BLD AUTO: 5.9 K/UL (ref 3.6–11)

## 2024-08-29 PROCEDURE — 36415 COLL VENOUS BLD VENIPUNCTURE: CPT

## 2024-08-29 PROCEDURE — 82962 GLUCOSE BLOOD TEST: CPT

## 2024-08-29 PROCEDURE — 99284 EMERGENCY DEPT VISIT MOD MDM: CPT

## 2024-08-29 PROCEDURE — 6360000004 HC RX CONTRAST MEDICATION: Performed by: UROLOGY

## 2024-08-29 PROCEDURE — 74178 CT ABD&PLV WO CNTR FLWD CNTR: CPT

## 2024-08-29 PROCEDURE — 85025 COMPLETE CBC W/AUTO DIFF WBC: CPT

## 2024-08-29 PROCEDURE — 84484 ASSAY OF TROPONIN QUANT: CPT

## 2024-08-29 PROCEDURE — 80048 BASIC METABOLIC PNL TOTAL CA: CPT

## 2024-08-29 RX ORDER — IOPAMIDOL 755 MG/ML
100 INJECTION, SOLUTION INTRAVASCULAR
Status: COMPLETED | OUTPATIENT
Start: 2024-08-29 | End: 2024-08-29

## 2024-08-29 RX ADMIN — IOPAMIDOL 100 ML: 755 INJECTION, SOLUTION INTRAVENOUS at 14:21

## 2024-08-29 NOTE — ED NOTES
Patient reports complete resolution of symptoms now that she has been lying flat for several minutes.

## 2024-08-29 NOTE — ED PROVIDER NOTES
[facility] EMERGENCY DEPT  EMERGENCY DEPARTMENT HISTORY AND PHYSICAL EXAM      Date: 8/29/2024  Patient Name: Daisy Michaud  MRN: [id number]  Birthdate 1960  Date of evaluation: 8/29/2024  Provider: Toribio Quiles MD   Note Started: 11:57 AM EDT 8/29/24    HISTORY OF PRESENT ILLNESS     Chief Complaint   Patient presents with    Hypoglycemia     Patient was having outpatient CT scan and began feeling lightheaded after IV was inserted. Patient reports these episodes have been happening since she was 6yrs old. Patient states she thinks her BG drops and typically needs to find a flat surface then feels fine after several minutes.        History Provided By: Patient    HPI: Daisy Michaud is a 64 y.o. female who comes to the ED for evaluation of an episode of lightheadedness.  Patient reports that chronically she will have an episode every other year and that she will have lightheadedness and resolves when she lays down.  She is back to normal self.  She does not have any associated preceding or following chest pain or shortness of breath.  No further lightheadedness or dizziness.  Does not have any lower extremity swelling or pain.  Reports following up with her primary care doctor and they were unable to discover the reason for her symptoms.  Patient does not have any recent illnesses that there is no vomiting or change in her bowel habits.    PAST MEDICAL HISTORY   Past Medical History:  Past Medical History:   Diagnosis Date    Arthritis     Bladder cancer (HCC)     Cervical cancer (HCC) 2006    with radical hysterectomy    Thyroid disease        Past Surgical History:  Past Surgical History:   Procedure Laterality Date    ANOMALOUS VENOUS RETURN REPAIR N/A 04/05/2024    CYSTOURETHROSCOPY AND TRANSURETHRAL RESECTION BLADDER TUMOR WITH MITOMYCIN INSTILLATION performed by Jerome Abarca MD at [facility] MAIN OR    BLADDER SURGERY N/A 05/06/2024    ROBOTIC ASSISTED LAPAROSCOPIC CYSTECTOMY WITH URETEROILEAL CONDUIT

## 2024-08-29 NOTE — DISCHARGE INSTRUCTIONS
Thank you for choosing our Emergency Department for your care.  It is our privilege to care for you in your time of need.  In the next several days, you may receive a survey via email or mailed to your home about your experience with our team.  We would greatly appreciate you taking a few minutes to complete the survey, as we use this information to learn what we have done well and what we could be doing better. Thank you for trusting us with your care!    Below you will find a list of your tests from today's visit.   Labs  Recent Results (from the past 12 hour(s))   POCT Glucose    Collection Time: 08/29/24 11:29 AM   Result Value Ref Range    POC Glucose 117 (H) 65 - 100 mg/dL    Performed by: Jama Holloway    Basic Metabolic Panel    Collection Time: 08/29/24 12:10 PM   Result Value Ref Range    Sodium 140 136 - 145 mmol/L    Potassium 4.3 3.5 - 5.1 mmol/L    Chloride 107 97 - 108 mmol/L    CO2 26 21 - 32 mmol/L    Anion Gap 7 5 - 15 mmol/L    Glucose 115 (H) 65 - 100 mg/dL    BUN 17 6 - 20 mg/dL    Creatinine 1.05 (H) 0.55 - 1.02 mg/dL    BUN/Creatinine Ratio 16 12 - 20      Est, Glom Filt Rate 59 (L) >60 ml/min/1.73m2    Calcium 9.2 8.5 - 10.1 mg/dL   CBC with Auto Differential    Collection Time: 08/29/24 12:10 PM   Result Value Ref Range    WBC 5.9 3.6 - 11.0 K/uL    RBC 3.97 3.80 - 5.20 M/uL    Hemoglobin 11.7 11.5 - 16.0 g/dL    Hematocrit 36.4 35.0 - 47.0 %    MCV 91.7 80.0 - 99.0 FL    MCH 29.5 26.0 - 34.0 PG    MCHC 32.1 30.0 - 36.5 g/dL    RDW 15.5 (H) 11.5 - 14.5 %    Platelets 241 150 - 400 K/uL    MPV 11.9 8.9 - 12.9 FL    Neutrophils % 59 32 - 75 %    Lymphocytes % 24 12 - 49 %    Monocytes % 10 5 - 13 %    Eosinophils % 6 0 - 7 %    Basophils % 1 0 - 1 %    Immature Granulocytes % 0 0.0 - 0.5 %    Neutrophils Absolute 3.5 1.8 - 8.0 K/UL    Lymphocytes Absolute 1.4 0.8 - 3.5 K/UL    Monocytes Absolute 0.6 0.0 - 1.0 K/UL    Eosinophils Absolute 0.4 0.0 - 0.4 K/UL    Basophils Absolute 0.1 0.0 -

## 2024-11-12 ENCOUNTER — OFFICE VISIT (OUTPATIENT)
Age: 64
End: 2024-11-12
Payer: MEDICARE

## 2024-11-12 VITALS
DIASTOLIC BLOOD PRESSURE: 73 MMHG | SYSTOLIC BLOOD PRESSURE: 109 MMHG | BODY MASS INDEX: 28.79 KG/M2 | HEART RATE: 96 BPM | WEIGHT: 190 LBS | HEIGHT: 68 IN

## 2024-11-12 DIAGNOSIS — C67.8 MALIGNANT NEOPLASM OF OVERLAPPING SITES OF BLADDER (HCC): Primary | ICD-10-CM

## 2024-11-12 DIAGNOSIS — T85.858D STENOSIS OF ILEAL CONDUIT STOMA, SUBSEQUENT ENCOUNTER: ICD-10-CM

## 2024-11-12 LAB
BILIRUBIN, URINE, POC: NEGATIVE
BLOOD URINE, POC: ABNORMAL
GLUCOSE URINE, POC: NEGATIVE
KETONES, URINE, POC: NEGATIVE
LEUKOCYTE ESTERASE, URINE, POC: ABNORMAL
NITRITE, URINE, POC: NEGATIVE
PH, URINE, POC: 6 (ref 4.6–8)
PROTEIN,URINE, POC: 30
SPECIFIC GRAVITY, URINE, POC: 1.03 (ref 1–1.03)
URINALYSIS CLARITY, POC: CLEAR
URINALYSIS COLOR, POC: YELLOW
UROBILINOGEN, POC: ABNORMAL

## 2024-11-12 PROCEDURE — G8427 DOCREV CUR MEDS BY ELIG CLIN: HCPCS | Performed by: UROLOGY

## 2024-11-12 PROCEDURE — G8400 PT W/DXA NO RESULTS DOC: HCPCS | Performed by: UROLOGY

## 2024-11-12 PROCEDURE — G8419 CALC BMI OUT NRM PARAM NOF/U: HCPCS | Performed by: UROLOGY

## 2024-11-12 PROCEDURE — 99214 OFFICE O/P EST MOD 30 MIN: CPT | Performed by: UROLOGY

## 2024-11-12 PROCEDURE — 1123F ACP DISCUSS/DSCN MKR DOCD: CPT | Performed by: UROLOGY

## 2024-11-12 PROCEDURE — 81003 URINALYSIS AUTO W/O SCOPE: CPT | Performed by: UROLOGY

## 2024-11-12 PROCEDURE — 3017F COLORECTAL CA SCREEN DOC REV: CPT | Performed by: UROLOGY

## 2024-11-12 PROCEDURE — 1090F PRES/ABSN URINE INCON ASSESS: CPT | Performed by: UROLOGY

## 2024-11-12 PROCEDURE — 1036F TOBACCO NON-USER: CPT | Performed by: UROLOGY

## 2024-11-12 PROCEDURE — G8484 FLU IMMUNIZE NO ADMIN: HCPCS | Performed by: UROLOGY

## 2024-11-12 NOTE — PROGRESS NOTES
Chief Complaint   Patient presents with    Follow-up     Malignant neoplasm of overlapping sites of bladder     1. Have you been to the ER, urgent care clinic since your last visit?  Hospitalized since your last visit?No    2. Have you seen or consulted any other health care providers outside of the [facility] since your last visit?  Include any pap smears or colon screening. No  /73   Pulse 96   Ht 1.727 m (5' 8\")   Wt 86.2 kg (190 lb)   BMI 28.89 kg/m²

## 2024-11-12 NOTE — ASSESSMENT & PLAN NOTE
She has a small stomal opening. It is draining well. She has trouble with long term adherence of her stoma.  She has minor skin irritation at the outer edges of the plate.  She should visit the stoma clinic.  They may have better recommendations about skin care and stoma adherence.  She may benefit from a belt or concave stoma.  1 piece versus 2-piece bags can be explored.

## 2024-11-12 NOTE — ASSESSMENT & PLAN NOTE
She had a cystectomy 5/6/24.  She had no evidence of recurrence on postop imaging.  Continue monitoring

## 2024-11-12 NOTE — PROGRESS NOTES
HISTORY OF PRESENT ILLNESS  Daisy Michaud is a 64 y.o. female.   has a past medical history of Arthritis, Bladder cancer (HCC), Cervical cancer (HCC), and Thyroid disease.  has a past surgical history that includes Cystocopy (04/03/2024); Total vaginal hysterectomy; bladder tumor excision; Ureteral reimplantion (Bilateral); [place] tooth extraction; Anomalous venous return repair (N/A, 04/05/2024); Bladder surgery (N/A, 05/06/2024); and Cystocopy (06/11/2024).  Chief Complaint   Patient presents with    Follow-up     Malignant neoplasm of overlapping sites of bladder     H/o cystectomy and ileal conduit 5/2024.   CT 8/29/24 without recurrence.     She has stoma stenosis but it is draining well.  She has some trouble keeping a stoma appliance more than 1 day.      She still has light vaginal discharge.  This is is decreasing.  There is no foul odor.        1. Malignant neoplasm of overlapping sites of bladder (HCC)  Overview:  HX of bladder cancer, recurrent since 2018 or 2017 with Dr Vazquez.   She is s/p TURBT (Tevin) 4/5/24; pathology significant for invasive high-grade urothelial carcinoma.     S/p cystectomy and ileal conduit 5/6/24.  Reverse 7 conduit.  H/o pelvic radiation. 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   Assessment & Plan:  She had a cystectomy 5/6/24.  She had no evidence of recurrence on postop imaging.  Continue monitoring  Orders:  -     AMB POC URINALYSIS DIP STICK AUTO W/O MICRO  2. Stenosis of ileal conduit stoma, subsequent encounter  Overview:  Flush stoma with some evidence of retraction and viable edges.  Assessment & Plan:  She has a small stomal opening. It is draining well. She has trouble with long term adherence of her stoma.  She has minor skin irritation at the outer edges of the plate.  She should visit the stoma clinic.  They may have better recommendations about skin care and stoma adherence.  She may benefit

## 2024-11-13 ENCOUNTER — PATIENT MESSAGE (OUTPATIENT)
Age: 64
End: 2024-11-13

## 2024-11-13 DIAGNOSIS — R23.8 SKIN BREAKDOWN: ICD-10-CM

## 2024-11-13 DIAGNOSIS — T85.858S: Primary | ICD-10-CM

## 2024-11-14 ENCOUNTER — HOSPITAL ENCOUNTER (OUTPATIENT)
Facility: HOSPITAL | Age: 64
Discharge: HOME | End: 2024-11-14
Payer: MEDICARE

## 2024-11-14 VITALS
SYSTOLIC BLOOD PRESSURE: 157 MMHG | TEMPERATURE: 97.1 F | DIASTOLIC BLOOD PRESSURE: 78 MMHG | HEART RATE: 79 BPM | RESPIRATION RATE: 16 BRPM

## 2024-11-14 PROCEDURE — 99213 OFFICE O/P EST LOW 20 MIN: CPT

## 2024-11-14 NOTE — WOUND CARE
Clinical Level of Care Assessment    Outpatient Ostomy Care      NAME:  Daisy Michaud  YOB: 1960  MEDICAL RECORD NUMBER:  [id number]   DATE:  11/14/2024      Patient /Ostomy Assessment- Document in Flowsheet I&O   Points   Review of chart []   0   Assess the Complete Ostomy tab in Navigator for assessment of; stoma status, peristomal skin, presence of hernia/stool consistency/diet/related medications.   Simple adjustments to pouch size/pouch system, new stoma pattern, accessory addition/deletion.   []   1   Assess the Complete Ostomy tab in Navigator for assessment of; stoma status, peristomal skin, presence of hernia/stool consistency/diet/related medications.   Moderate adjustments to pouch size/pouch system, new stoma pattern, and accessory addition/deletion.  Observe patient/caregiver with hands-on care.   1-2 adjustments to pouch size/system/skincare/accessory addition or deletion.    []   2   Assess the Complete Ostomy tab in Navigator for assessment of; stoma status, peristomal skin, presence of hernia/stool consistency/diet/related medications.   Complex adjustments to pouch size/pouch system, new stoma pattern, accessory addition/deletion.  3 or more complex adjustments to pouch size/system/skincare/accessory addition or deletion.  Observe patient/caregiver with hands-on care.   Assess patient/patient's abdomen for optimal pre-marked stoma site.  Assess the patient's abdomen for a type of hernia belt/accessory needed. [x]   3         Ambulation Status Documented in CN Clinical Note  Status Definition Points   Independent Independently able to ambulate.  Fully able (without any assistance) to get on/off the exam table/chair.    [x]   0   Minimal Physical Assistance Requires physical assistance of one person to ambulate and position the patient to be examined. Includes necessary physical assistance to position lower extremities on/off the stool.   []   1   Moderate Physical Assistance 
OSTOMY Assessment    Stoma Tissue Assessment: Post-op _x__Follow-up       Type of Diversion: ___New_x__ Established ___Revision___Permanent____Temporary    _____  Ileostomy   _____  Colostomy: ____ Ascending, ____ Transverse, _____. Sigmoid   _____  Urostomy   ___x__  Ileal Conduit   _____  Mucous Fistula     Appearance of Ostomy:   _x__ Bright Red /Moist/Viable ___ Dark Red ___ Pink ___ Sloughing ___Necrotic____Pallor ____Red  ___Dry ____Moist    Stoma Size: ____18_____ (mm) _x__Round ___ Oval ___Irregular     Stoma Height:   ____Flush _x___Retracted ____Budded ____Edematous ____Prolapse     Stoma Location  ____ Left Side          __x__Right Side     _____Umbilicus  _____Incision Line  ____ Above Belt       __x__ Below Belt    Abdominal Contours  ____ Firm ____ Flat ____Flabby _x__Soft _x__Round ___ Hard ___ Other     Stoma Function: _x_Yes __No  Output:   _x___ Yellow ____Amber ____ Pink(Hematuria) ____ Tea Colored   ____ Clots ____Foul Odor ____ Mucous     Peristomal skin:   ___ Intact _x__ Irritant Dermatitis ___ Allergic Contact Dermatitis ___Candidiasis ___Caput Medusae ___Folliculitis ___Mechanical Trauma ___Mucosal Transplantation    ___Pyoderma Gangrenosum ___Hyperplasia ___Radiation Trauma ___Allergies mpling  ___ Dimpling ____Blistered ____Fragile ____Macerated ___Peeling  ___Ulceration  ___ Fungal ___Peristomal Hernia ___Non-blanchable ___ Hypergranulation      Stoma Complications:   __Excessive Bleeding __Ischemia __ Abscess __Necrosis __Prolapse   __Hernia __ Retraction __Stenosis __Mucosal Separation __Melanosis Coli   __Laceration __Other     Application for Patient    Wafer and pouch used during assessment and education __Surinder [id number]__________    Pouch System Recommended:   __One-Piece __Two-Piece ___Custom     Flat:   ___Pre-cut   ___Cut-to fit     Convexity: ___Shallow ___Deep_x__ Flexible ___Creative Convexity   ___Pre-cut _x__Cut to Fit     Accessory Products   _x_ Adhesive Seals 
Ostomy Care Plan  Risk for impaired skin integrity  Goal: Patient will exhibit intact peristomal skin at each visit.  Interventions: Assess patients peristomal skin at each visit.                            Instruct patient in peristomal skin care procedures at each visit.    Outcomes: Patient will demonstrate proper sizing and application of wafer.                       Patient will demonstrate intact peristomal skin without irritation.                       Patient will verbalize two strategies to prevent skin irritation.    Risk for disturbed body image  Goal: Patient will verbalize concerns in appearance, relationships and lifestyle changes.  Interventions: Assess patients coping mechanisms and body image issues at each visit.  Outcomes: Patient will verbalize/demonstrate comfort with body image as evidenced by willingness to manage ostomy care and acceptance of body image change.     Knowledge Deficit r/t Ostomy Care  Goal: Patient will verbalize/demonstrate independent ostomy care by end of teaching sessions.  Interventions: Assess patients understanding/ability to manage ostomy care including daily maintenance of pouch and intermittent changing of appliance.  Outcomes: Patient will demonstrate ability to remove, clean and reapply ostomy pouch.  Patient will verbalize understanding of ostomy pouching complications (i.e. failure to maintain a seal for at least 3 to 4 days, leaking of pouch, skin irritation of peristomal skin).     Goal 1 addressed & resolved at todays visit.  Goal 2 addressed today, patient verbalized understanding but required some verbal cuing. We will revisit this at next visit.   
cannot wait until we are again available, contact your PCP or go to the hospital emergency room.     PLEASE NOTE: IF YOU ARE UNABLE TO OBTAIN WOUND SUPPLIES, CONTINUE TO USE THE SUPPLIES YOU HAVE AVAILABLE UNTIL YOU ARE ABLE TO REACH US. IT IS MOST IMPORTANT TO KEEP THE WOUND COVERED AT ALL TIMES.     CWON Signature:_______________________    Date: ___________ Time:  ____________

## 2024-12-02 ENCOUNTER — HOSPITAL ENCOUNTER (OUTPATIENT)
Facility: HOSPITAL | Age: 64
Discharge: HOME | End: 2024-12-02
Payer: MEDICARE

## 2024-12-02 VITALS
HEART RATE: 74 BPM | RESPIRATION RATE: 16 BRPM | SYSTOLIC BLOOD PRESSURE: 162 MMHG | TEMPERATURE: 97.9 F | DIASTOLIC BLOOD PRESSURE: 74 MMHG

## 2024-12-02 PROCEDURE — 99212 OFFICE O/P EST SF 10 MIN: CPT

## 2024-12-02 NOTE — WOUND CARE
OSTOMY Assessment     Stoma Tissue Assessment: Post-op _x__Follow-up        Type of Diversion: ___New_x__ Established ___Revision___Permanent____Temporary     _____  Ileostomy   _____  Colostomy: ____ Ascending, ____ Transverse, _____. Sigmoid   _____  Urostomy   ___x__  Ileal Conduit   _____  Mucous Fistula      Appearance of Ostomy:   _x__ Bright Red /Moist/Viable ___ Dark Red ___ Pink ___ Sloughing ___Necrotic____Pallor ____Red  ___Dry ____Moist     Stoma Size: ____16_____ (mm)_x__Round ___ Oval ___Irregular      Stoma Height:   ____Flush _x___Retracted ____Budded ____Edematous ____Prolapse      Stoma Location  ____ Left Side          __x__Right Side     _____Umbilicus  _____Incision Line  ____ Above Belt       __x__ Below Belt     Abdominal Contours  ____ Firm ____ Flat ____Flabby _x__Soft _x__Round ___ Hard ___ Other      Stoma Function: _x_Yes __No  Output:   _x___ Yellow ____Amber ____ Pink(Hematuria) ____ Tea Colored   ____ Clots ____Foul Odor ____ Mucous      Peristomal skin:   ___ Intact _x__ Irritant Dermatitis ___ Allergic Contact Dermatitis ___Candidiasis ___Caput Medusae ___Folliculitis ___Mechanical Trauma ___Mucosal Transplantation    ___Pyoderma Gangrenosum ___Hyperplasia ___Radiation Trauma ___Allergies mpling  ___ Dimpling ____Blistered ____Fragile _x___Macerated ___Peeling  ___Ulceration  ___ Fungal ___Peristomal Hernia ___Non-blanchable ___ Hypergranulation        Stoma Complications:   __Excessive Bleeding __Ischemia __ Abscess __Necrosis __Prolapse   __Hernia __ Retraction __Stenosis __Mucosal Separation __Melanosis Coli   __Laceration __Other      Application for Patient     Wafer and pouch used during assessment and education __Surinder [id number]__________     Pouch System Recommended:   __One-Piece __Two-Piece ___Custom      Flat:   ___Pre-cut   ___Cut-to fit      Convexity: ___Shallow ___Deep_x__ Flexible ___Creative Convexity   ___Pre-cut _x__Cut to Fit      Accessory Products   _x_

## 2024-12-02 NOTE — WOUND CARE
Discharge Instructions/Wound Orders  [facility]  [address], [address]  [place] [address]  Telephone: [phone]     FAX [phone]    CPT Codes: E&M-Level 2 (97874)    NAME:  Daisy iMchaud  YOB: 1960  MEDICAL RECORD NUMBER:  [id number]  DATE:  12/2/2024  Ostomy Care Orders:  1) Remove old bag and clean stoma and peristomal skin with tap water and paper towels, dry thoroughly.  2) If skin is irritated or extremely moist, treat with crusting (I.e. light sprinkle of stoma powder), then dot with no sting skin prep.  3) Break (use approximately 2/3's and dispose of rest)  Marisela barrier ring [id number] to size (do not stretch), crimp inner border, then place over stoma site.  4) Use Surinder 03702 (DO NOT CUT),  remove plastic backing and place over stoma and ring.  5) Remove paper from tape border, smooth tape into place (wrinkles will not cause leaking).  6) Attach ostomy belt [place] 7300.    Dietary:  [x] Diet as tolerated: [] Calorie Diabetic Diet:Low carb and no Sugar [] No Added Salt:[] Increase Protein: [] Other:Limit the amount of liquid you are drinking and avoid drinking in between meals   Activity:  [x] Activity as tolerated:  [] Patient has no activity restrictions     [] Strict Bedrest: [] Remain off Work:     [] May return to full duty work:                                   [] Return to work with restrictions:             Return Appointment:  [] Return Appointment: With HERB Ibarra  in  3 Week(s)  [] Ordered tests:    Electronically signed AIDA PAINTER RN on 12/2/2024 at 1:28 PM     Wound Care Center Information: Should you experience any significant changes in your wound(s) or have questions about your wound care, please contact the [facility] at MONDAY - FRIDAY 8:00 am - 4:30.  If you need help with your wound outside these hours and cannot wait until we are again available, contact your PCP or go to the

## 2024-12-02 NOTE — WOUND CARE
Clinical Level of Care Assessment    Outpatient Ostomy Care      NAME:  Daisy Michaud  YOB: 1960  MEDICAL RECORD NUMBER:  [id number]   DATE:  12/2/2024      Patient /Ostomy Assessment- Document in Flowsheet I&O   Points   Review of chart []   0   Assess the Complete Ostomy tab in Navigator for assessment of; stoma status, peristomal skin, presence of hernia/stool consistency/diet/related medications.   Simple adjustments to pouch size/pouch system, new stoma pattern, accessory addition/deletion.   [x]   1   Assess the Complete Ostomy tab in Navigator for assessment of; stoma status, peristomal skin, presence of hernia/stool consistency/diet/related medications.   Moderate adjustments to pouch size/pouch system, new stoma pattern, and accessory addition/deletion.  Observe patient/caregiver with hands-on care.   1-2 adjustments to pouch size/system/skincare/accessory addition or deletion.    []   2   Assess the Complete Ostomy tab in Navigator for assessment of; stoma status, peristomal skin, presence of hernia/stool consistency/diet/related medications.   Complex adjustments to pouch size/pouch system, new stoma pattern, accessory addition/deletion.  3 or more complex adjustments to pouch size/system/skincare/accessory addition or deletion.  Observe patient/caregiver with hands-on care.   Assess patient/patient's abdomen for optimal pre-marked stoma site.  Assess the patient's abdomen for a type of hernia belt/accessory needed. []   3         Ambulation Status Documented in CN Clinical Note  Status Definition Points   Independent Independently able to ambulate.  Fully able (without any assistance) to get on/off the exam table/chair.    [x]   0   Minimal Physical Assistance Requires physical assistance of one person to ambulate and position the patient to be examined. Includes necessary physical assistance to position lower extremities on/off the stool.   []   1   Moderate Physical Assistance

## 2024-12-02 NOTE — WOUND CARE
Ostomy Care Plan  Risk for impaired skin integrity  Goal: Patient will exhibit intact peristomal skin at each visit.  Interventions: Assess patients peristomal skin at each visit.                            Instruct patient in peristomal skin care procedures at each visit.     Outcomes: Patient will demonstrate proper sizing and application of wafer.                       Patient will demonstrate intact peristomal skin without irritation.                       Patient will verbalize two strategies to prevent skin irritation.     Risk for disturbed body image  Goal: Patient will verbalize concerns in appearance, relationships and lifestyle changes.  Interventions: Assess patients coping mechanisms and body image issues at each visit.  Outcomes: Patient will verbalize/demonstrate comfort with body image as evidenced by willingness to manage ostomy care and acceptance of body image change.      Knowledge Deficit r/t Ostomy Care  Goal: Patient will verbalize/demonstrate independent ostomy care by end of teaching sessions.  Interventions: Assess patients understanding/ability to manage ostomy care including daily maintenance of pouch and intermittent changing of appliance.  Outcomes: Patient will demonstrate ability to remove, clean and reapply ostomy pouch.  Patient will verbalize understanding of ostomy pouching complications (i.e. failure to maintain a seal for at least 3 to 4 days, leaking of pouch, skin irritation of peristomal skin).      Goal 1 Reviewed at todays visit, remains resolved.   Goal 2 addressed today, patient verbalized understanding but required some verbal cuing of slight changes to regimen. We will revisit this at next visit.

## 2024-12-23 ENCOUNTER — HOSPITAL ENCOUNTER (OUTPATIENT)
Facility: HOSPITAL | Age: 64
Discharge: HOME | End: 2024-12-23
Payer: MEDICARE

## 2024-12-23 VITALS
RESPIRATION RATE: 16 BRPM | HEART RATE: 84 BPM | SYSTOLIC BLOOD PRESSURE: 153 MMHG | TEMPERATURE: 97 F | DIASTOLIC BLOOD PRESSURE: 88 MMHG

## 2024-12-23 PROCEDURE — 99212 OFFICE O/P EST SF 10 MIN: CPT

## 2024-12-23 NOTE — WOUND CARE
Discharge Instructions/Wound Orders  [facility]  [address], [address]  [place] [address]  Telephone: [phone]     FAX [phone]    CPT Codes: E&M-Level 2 (64567)    NAME:  Daisy Michaud  YOB: 1960  MEDICAL RECORD NUMBER:  [id number]  DATE:  12/23/2024  Ostomy Care Orders:  1) Remove old bag and clean stoma and peristomal skin with tap water and paper towels, dry thoroughly.  2) If skin is irritated or extremely moist, treat with crusting (I.e. light sprinkle of stoma powder), then dot with no sting skin prep.  3) Break (use approximately 2/3's and dispose of rest)  Marisela barrier ring [id number] to size (do not stretch), crimp inner border, then place over stoma site.  4) Use [place] [address] (DO NOT CUT),  remove plastic backing and place over stoma and ring.  5) Remove paper from tape border, smooth tape into place (wrinkles will not cause leaking).  6) Attach ostomy belt Surinder 7300.    Dietary:  [x] Diet as tolerated: [] Calorie Diabetic Diet:Low carb and no Sugar [] No Added Salt:[] Increase Protein: [] Other:Limit the amount of liquid you are drinking and avoid drinking in between meals   Activity:  [x] Activity as tolerated:  [] Patient has no activity restrictions     [] Strict Bedrest: [] Remain off Work:     [] May return to full duty work:                                   [] Return to work with restrictions:             Return Appointment:  [] Return Appointment: No further follow-up at this time.   [] Ordered tests:    Electronically signed AIDA PAINTER RN on 12/23/2024 at 10:10 AM     Wound Care Center Information: Should you experience any significant changes in your wound(s) or have questions about your wound care, please contact the [facility] at MONDAY - FRIDAY 8:00 am - 4:30.  If you need help with your wound outside these hours and cannot wait until we are again available, contact your PCP or go to the 
OSTOMY Assessment      Stoma Tissue Assessment: Post-op _x__Follow-up        Type of Diversion: ___New_x__ Established ___Revision___Permanent____Temporary     _____  Ileostomy   _____  Colostomy: ____ Ascending, ____ Transverse, _____. Sigmoid   _____  Urostomy   ___x__  Ileal Conduit   _____  Mucous Fistula      Appearance of Ostomy:   _x__ Bright Red /Moist/Viable ___ Dark Red ___ Pink ___ Sloughing ___Necrotic____Pallor ____Red  ___Dry ____Moist     Stoma Size: ____16_____ (mm)_x__Round ___ Oval ___Irregular      Stoma Height:   ____Flush _x___Retracted ____Budded ____Edematous ____Prolapse      Stoma Location  ____ Left Side          __x__Right Side     _____Umbilicus  _____Incision Line  ____ Above Belt       __x__ Below Belt     Abdominal Contours  ____ Firm ____ Flat ____Flabby _x__Soft _x__Round ___ Hard ___ Other      Stoma Function: _x_Yes __No  Output:   _x___ Yellow ____Amber ____ Pink(Hematuria) ____ Tea Colored   ____ Clots ____Foul Odor ____ Mucous      Peristomal skin:   ___ Intact _x__ Irritant Dermatitis ___ Allergic Contact Dermatitis ___Candidiasis ___Caput Medusae ___Folliculitis ___Mechanical Trauma ___Mucosal Transplantation    ___Pyoderma Gangrenosum ___Hyperplasia ___Radiation Trauma ___Allergies mpling  ___ Dimpling ____Blistered ____Fragile _x___Macerated ___Peeling  ___Ulceration  ___ Fungal ___Peristomal Hernia ___Non-blanchable ___ Hypergranulation        Stoma Complications:   __Excessive Bleeding __Ischemia __ Abscess __Necrosis __Prolapse   __Hernia __ Retraction __Stenosis __Mucosal Separation __Melanosis Coli   __Laceration __Other      Application for Patient     Wafer and pouch used during assessment and education __Surinder 84138__________     Pouch System Recommended:   __One-Piece __Two-Piece ___Custom      Flat:   ___Pre-cut   ___Cut-to fit      Convexity: ___Shallow ___Deep_x__ Flexible ___Creative Convexity   ___Pre-cut _x__Cut to Fit      Accessory Products   _x_ 
Ostomy Care Plan  Risk for impaired skin integrity  Goal: Patient will exhibit intact peristomal skin at each visit.  Interventions: Assess patients peristomal skin at each visit.                            Instruct patient in peristomal skin care procedures at each visit.    Outcomes: Patient will demonstrate proper sizing and application of wafer.                       Patient will demonstrate intact peristomal skin without irritation.                       Patient will verbalize two strategies to prevent skin irritation.    Risk for disturbed body image  Goal: Patient will verbalize concerns in appearance, relationships and lifestyle changes.  Interventions: Assess patients coping mechanisms and body image issues at each visit.  Outcomes: Patient will verbalize/demonstrate comfort with body image as evidenced by willingness to manage ostomy care and acceptance of body image change.     Knowledge Deficit r/t Ostomy Care  Goal: Patient will verbalize/demonstrate independent ostomy care by end of teaching sessions.  Interventions: Assess patients understanding/ability to manage ostomy care including daily maintenance of pouch and intermittent changing of appliance.  Outcomes: Patient will demonstrate ability to remove, clean and reapply ostomy pouch.  Patient will verbalize understanding of ostomy pouching complications (i.e. failure to maintain a seal for at least 3 to 4 days, leaking of pouch, skin irritation of peristomal skin).     Goals 1 and 2 were resolved during todays visit.   
demonstration visit.   Pouching/discharge procedure revised/reviewed with patient/family/caregiver.      Contact with outside resources; i.e. communication with Surgeon/ PCP, home health, ECF.    Contact/referral to ostomy appliance supplier for new or additional products.   Review when to call [facility] or schedule follow-up visit.   Referral to Emergency Department   Documentation in [facility] completed. []   3       Is this the Patient's First Visit with [facility] @ [facility]?  No    Is this Patient Established to this [facility] within the last 3 years?   Yes             Clinical Level of Care      Points  0-3  Level 1 []     Points  4-6  Level 2 [x]     Points  7-8  Level 3 []     Points  9-10  Level 4 []     Points  11-12  Level 5 []       Electronically signed by AIDA PAINTER RN on 12/23/2024 at 10:26 AM

## 2024-12-26 ENCOUNTER — TELEPHONE (OUTPATIENT)
Age: 64
End: 2024-12-26

## 2024-12-26 NOTE — TELEPHONE ENCOUNTER
Patient called regarding ostomy power that her ostomy nurse wanted put in for her. She gave me the number to give Sonia Mullen her Ostomy nurse a call regarding this rx. Sonia informed me that it needs to be Nystatin Powder 60 gram bottle. Is there anyway we can get this rx put in for her.

## 2024-12-27 RX ORDER — NYSTATIN 100000 [USP'U]/G
POWDER TOPICAL
Qty: 60 G | Refills: 3 | Status: SHIPPED | OUTPATIENT
Start: 2024-12-27

## 2025-01-14 ENCOUNTER — OFFICE VISIT (OUTPATIENT)
Age: 65
End: 2025-01-14
Payer: MEDICARE

## 2025-01-14 VITALS — HEART RATE: 52 BPM | SYSTOLIC BLOOD PRESSURE: 126 MMHG | DIASTOLIC BLOOD PRESSURE: 83 MMHG

## 2025-01-14 DIAGNOSIS — T85.858D STENOSIS OF ILEAL CONDUIT STOMA, SUBSEQUENT ENCOUNTER: Primary | ICD-10-CM

## 2025-01-14 PROCEDURE — G8427 DOCREV CUR MEDS BY ELIG CLIN: HCPCS | Performed by: NURSE PRACTITIONER

## 2025-01-14 PROCEDURE — 1090F PRES/ABSN URINE INCON ASSESS: CPT | Performed by: NURSE PRACTITIONER

## 2025-01-14 PROCEDURE — 1123F ACP DISCUSS/DSCN MKR DOCD: CPT | Performed by: NURSE PRACTITIONER

## 2025-01-14 PROCEDURE — G8400 PT W/DXA NO RESULTS DOC: HCPCS | Performed by: NURSE PRACTITIONER

## 2025-01-14 PROCEDURE — 99212 OFFICE O/P EST SF 10 MIN: CPT | Performed by: NURSE PRACTITIONER

## 2025-01-14 PROCEDURE — G8419 CALC BMI OUT NRM PARAM NOF/U: HCPCS | Performed by: NURSE PRACTITIONER

## 2025-01-14 PROCEDURE — 1036F TOBACCO NON-USER: CPT | Performed by: NURSE PRACTITIONER

## 2025-01-14 PROCEDURE — 3017F COLORECTAL CA SCREEN DOC REV: CPT | Performed by: NURSE PRACTITIONER

## 2025-01-14 NOTE — PROGRESS NOTES
HISTORY OF PRESENT ILLNESS  Daisy Michaud is a 64 y.o. female.  Chief Complaint   Patient presents with    Follow-up     Stoma irritation     Ms. Michaud returns today for ileal conduit stoma skin irritation. She is using nystatin powder to assist with keeping the skin dry (recommended by the ostomy nurse) but wanted to be reassured the skin appearance is not concerning. She denies pain or urinary drainage issues.       PAST MEDICAL HISTORY:  PMHx (including negatives):  has a past medical history of Arthritis, Bladder cancer (HCC), Cervical cancer (HCC), and Thyroid disease.   PSurgHx:  has a past surgical history that includes Cystocopy (04/03/2024); Total vaginal hysterectomy; bladder tumor excision; Ureteral reimplantion (Bilateral); [place] tooth extraction; Anomalous venous return repair (N/A, 04/05/2024); Bladder surgery (N/A, 05/06/2024); and Cystocopy (06/11/2024).  PSocHx:  reports that she has quit smoking. Her smoking use included cigarettes. She has never used smokeless tobacco. She reports that she does not currently use alcohol. She reports current drug use. Drug: Marijuana (Weed).     Review of Systems   Genitourinary:  Negative for difficulty urinating.   Skin:         Skin irritation around ostomy site     Physical Exam  Constitutional:       Appearance: Normal appearance.   Skin:     General: Skin is warm and dry.      Comments: Skin around ostomy site appears irritated with mild erythremia. No bleeding noted.    Neurological:      General: No focal deficit present.      Mental Status: She is alert and oriented to person, place, and time.       ASSESSMENT AND PLAN    Stenosis of ileal conduit stoma, subsequent encounter  Assessment & Plan:   Stoma site small but draining well. Skin around stoma appears irritated but not concerning. She will continue Nystatin use to keep area dry. No issues with significant leak. Follow up in 6 months or sooner if needed.       No follow-ups on file.     Rosalie JON

## 2025-01-14 NOTE — PROGRESS NOTES
Chief Complaint   Patient presents with    Follow-up     Stoma irritation    1. Have you been to the ER, urgent care clinic since your last visit?  Hospitalized since your last visit?No    2. Have you seen or consulted any other health care providers outside of the [facility] since your last visit?  Include any pap smears or colon screening. No   /83 (Site: Left Upper Arm, Position: Sitting, Cuff Size: Medium Adult)   Pulse 52

## 2025-01-14 NOTE — ASSESSMENT & PLAN NOTE
Stoma site small but draining well. Skin around stoma appears irritated but not concerning. She will continue Nystatin use to keep area dry. No issues with significant leak. Follow up in 6 months or sooner if needed.

## 2025-03-11 DIAGNOSIS — R23.8 SKIN BREAKDOWN: Primary | ICD-10-CM

## (undated) DEVICE — CATHETER URETH 22FR BLLN 5CC STD LTX 2 W TWO OPP DRNGE EYE

## (undated) DEVICE — LAPAROSCOPIC SCISSORS: Brand: EPIX LAPAROSCOPIC SCISSORS

## (undated) DEVICE — TOWEL,OR,DSP,ST,BLUE,DLX,6/PK,12PK/CS: Brand: MEDLINE

## (undated) DEVICE — MASTISOL ADHESIVE LIQ 2/3ML

## (undated) DEVICE — SEAL

## (undated) DEVICE — THE STERILE LIGHT HANDLE COVER IS USED WITH STERIS SURGICAL LIGHTING AND VISUALIZATION SYSTEMS.

## (undated) DEVICE — PAD,NON-ADHERENT,2X3,STERILE,LF,1/PK: Brand: MEDLINE

## (undated) DEVICE — GLOVE ORANGE PI 7 1/2   MSG9075

## (undated) DEVICE — Z DISCONTINUED SPONGE LAPAROTOMY W18XL18IN WHITE STRUNG RADIOPAQUE STERILE

## (undated) DEVICE — SUTURE MONOCRYL + ABSORBABLE MONOFILAMENT 4-0 RB1 27 IN VIO MCP304H

## (undated) DEVICE — BASIC SINGLE BASIN-LF: Brand: MEDLINE INDUSTRIES, INC.

## (undated) DEVICE — BLADE,CARBON-STEEL,15,STRL,DISPOSABLE,TB: Brand: MEDLINE

## (undated) DEVICE — Device

## (undated) DEVICE — SOLUTION SCRB 4OZ 4% CHG H2O AIDED FOR PREOPERATIVE SKIN

## (undated) DEVICE — Device: Brand: SENSURA MIO CONVEX FLIP

## (undated) DEVICE — DBD-PACK,LAVH,STERILE: Brand: MEDLINE

## (undated) DEVICE — 1LYRTR 16FR10ML100%SIL UMS SNP: Brand: MEDLINE INDUSTRIES, INC.

## (undated) DEVICE — DRAPE THER FLUID WARMING 66X44 IN FLAT SLUSH DBL DISC ORS

## (undated) DEVICE — SOLUTION IRRIG 500ML STRL H2O NONPYROGENIC

## (undated) DEVICE — CATHETER URETH 16FR BLLN 5CC STD LTX 2 W F TWO OPP DRNGE

## (undated) DEVICE — SUTURE NONABSORBABLE MONOFILAMENT 4-0 RB-1 36 IN BLU PROLENE 8557H

## (undated) DEVICE — SUTURE VICRYL + SZ 0 L27IN ABSRB VLT L26MM UR-6 5/8 CIR VCP603H

## (undated) DEVICE — 2-PIECE OSTOMY SKIN BARRIER, FLEXWEAR: Brand: NEW IMAGE

## (undated) DEVICE — SUTURE CHROMIC GUT SZ 3-0 L27IN ABSRB BRN L26MM SH 1/2 CIR G122H

## (undated) DEVICE — VAGINAL PREP TRAY: Brand: MEDLINE INDUSTRIES, INC.

## (undated) DEVICE — BAG,DRAINAGE,ANTI-REFLUX TOWER,2000ML: Brand: MEDLINE

## (undated) DEVICE — SPONGE,DRAIN,NONWVN,4"X4",6PLY,STRL,LF: Brand: MEDLINE

## (undated) DEVICE — SYRINGE MED 30ML STD CLR PLAS LUERLOCK TIP N CTRL DISP

## (undated) DEVICE — ELECTRODE PT RET AD L9FT HI MOIST COND ADH HYDRGEL CORDED

## (undated) DEVICE — TIP COVER ACCESSORY

## (undated) DEVICE — DRAPE,TOP,102X53,STERILE: Brand: MEDLINE

## (undated) DEVICE — PREP PAD BNS: Brand: CONVERTORS

## (undated) DEVICE — SYRINGE CATH TIP 50ML

## (undated) DEVICE — SUTURE VICRYL + SZ 2-0 L27IN ABSRB WHT SH 1/2 CIR TAPERCUT VCP417H

## (undated) DEVICE — SUTURE MONOCRYL + ABSORBABLE MONOFILAMENT 4-0 RB1 27 IN UD MCP214H

## (undated) DEVICE — VESSEL SEALER EXTEND: Brand: ENDOWRIST

## (undated) DEVICE — TUBING, SUCTION, 1/4" X 12', STRAIGHT: Brand: MEDLINE

## (undated) DEVICE — COLUMN DRAPE

## (undated) DEVICE — SUTURE VICRYL + SZ 3-0 L27IN ABSRB UD L26MM SH 1/2 CIR VCP416H

## (undated) DEVICE — GUIDEWIRE ENDOSCP L150CM DIA0.035IN TIP 3CM PTFE NIT

## (undated) DEVICE — PAD POSITIONING WNG STD KIT W/BODY STRP LF DISP

## (undated) DEVICE — GLOVE SURG SZ 75 L12IN FNGR THK79MIL GRN LTX FREE

## (undated) DEVICE — ACCESS PLATFORM FOR MINIMALLY INVASIVE SURGERY: Brand: GELPOINT®  MINI ADVANCED ACCESS PLATFORM

## (undated) DEVICE — SOLUTION IRRIG 3000ML 0.9% SOD CHL USP UROMATIC PLAS CONT

## (undated) DEVICE — TROCAR ENDOSCP L100MM DIA12MM STBL SL BLDELSS ENDOPATH XCEL

## (undated) DEVICE — DRAPE,UTILITY,TAPE,15X26,STERILE: Brand: MEDLINE

## (undated) DEVICE — LIQUIBAND RAPID ADHESIVE 36/CS 0.8ML: Brand: MEDLINE

## (undated) DEVICE — SOLUTION IV 1000ML 0.9% SOD CHL PH 5 INJ USP VIAFLX PLAS

## (undated) DEVICE — SOUTHSIDE TURNOVER: Brand: MEDLINE INDUSTRIES, INC.

## (undated) DEVICE — HYPODERMIC SAFETY NEEDLE: Brand: MONOJECT

## (undated) DEVICE — BLADELESS OBTURATOR: Brand: WECK VISTA

## (undated) DEVICE — SUTURE PERMAHAND SZ 0 L18IN NONABSORBABLE BLK SILK BRAID A186H

## (undated) DEVICE — GARMENT,MEDLINE,DVT,INT,CALF,MED, GEN2: Brand: MEDLINE

## (undated) DEVICE — LAPAROSCOPIC CHOLE PACK: Brand: MEDLINE INDUSTRIES, INC.

## (undated) DEVICE — PUMP SUC IRR TBNG L10FT W/ HNDPC ASSEMB STRYKEFLOW 2

## (undated) DEVICE — SUTURE MONOCRYL + SZ 4-0 L27IN ABSRB UD L19MM PS-2 3/8 CIR MCP426H

## (undated) DEVICE — SOLUTION IRRIG 500ML 0.9% SOD CHLO USP POUR PLAS BTL

## (undated) DEVICE — SUTURE CHROMIC GUT SZ 4-0 L27IN ABSRB BRN L17MM RB-1 1/2 U203H

## (undated) DEVICE — GLOVE ORANGE PI 7   MSG9070

## (undated) DEVICE — ARM DRAPE

## (undated) DEVICE — CONTAINER,SPECIMEN,PNEU TUBE,4OZ,OR STRL: Brand: MEDLINE

## (undated) DEVICE — TUBING INSUFFLATOR HEAT HUMIDIFIED SMK EVAC SET PNEUMOCLEAR

## (undated) DEVICE — DRAIN SURG 15FR SIL SMOOTH RND 1/8IN END PERF W/ TRCR RADPQ

## (undated) DEVICE — SUTURE ETHILON SZ 2-0 L18IN NONABSORBABLE BLK L26MM FS 3/8 664G

## (undated) DEVICE — 2-PIECE UROSTOMY POUCH: Brand: NEW IMAGE

## (undated) DEVICE — SUTURE PERMAHAND SZ 3-0 L30IN NONABSORBABLE BLK SH L26MM K832H

## (undated) DEVICE — SURGICAL PROCEDURE PACK CYSTO CHS

## (undated) DEVICE — SYRINGE,TOOMEY,IRRIGATION,70CC,STERILE: Brand: MEDLINE

## (undated) DEVICE — TROCAR LAP L100MM DIA5MM BLDELSS W/ STBL SL ENDOPATH XCEL

## (undated) DEVICE — POUCH SPEC RETRV SHFT 15MM 13X23CM GRN POLYUR DBL RNG HNDL